# Patient Record
Sex: MALE | URBAN - METROPOLITAN AREA
[De-identification: names, ages, dates, MRNs, and addresses within clinical notes are randomized per-mention and may not be internally consistent; named-entity substitution may affect disease eponyms.]

---

## 2023-11-23 ENCOUNTER — APPOINTMENT (OUTPATIENT)
Dept: RADIOLOGY | Facility: HOSPITAL | Age: 78
DRG: 438 | End: 2023-11-23
Payer: MEDICARE

## 2023-11-23 ENCOUNTER — HOSPITAL ENCOUNTER (INPATIENT)
Facility: HOSPITAL | Age: 78
LOS: 3 days | Discharge: HOME | DRG: 438 | End: 2023-11-26
Attending: INTERNAL MEDICINE | Admitting: INTERNAL MEDICINE
Payer: MEDICARE

## 2023-11-23 DIAGNOSIS — F39 MOOD DISORDER (CMS-HCC): ICD-10-CM

## 2023-11-23 DIAGNOSIS — J96.01 ACUTE HYPOXIC RESPIRATORY FAILURE (MULTI): ICD-10-CM

## 2023-11-23 DIAGNOSIS — N17.9 AKI (ACUTE KIDNEY INJURY) (CMS-HCC): ICD-10-CM

## 2023-11-23 DIAGNOSIS — K59.00 CONSTIPATION, UNSPECIFIED CONSTIPATION TYPE: ICD-10-CM

## 2023-11-23 DIAGNOSIS — R52 ACUTE PAIN: ICD-10-CM

## 2023-11-23 DIAGNOSIS — K86.89 PANCREATIC MASS (HHS-HCC): Primary | ICD-10-CM

## 2023-11-23 LAB
ABO GROUP (TYPE) IN BLOOD: NORMAL
ALBUMIN SERPL BCP-MCNC: 3.2 G/DL (ref 3.4–5)
ALP SERPL-CCNC: 415 U/L (ref 33–136)
ALT SERPL W P-5'-P-CCNC: 134 U/L (ref 10–52)
ANION GAP SERPL CALC-SCNC: 18 MMOL/L (ref 10–20)
ANTIBODY SCREEN: NORMAL
APPEARANCE UR: ABNORMAL
APTT PPP: >200 SECONDS (ref 27–38)
AST SERPL W P-5'-P-CCNC: 113 U/L (ref 9–39)
BASOPHILS # BLD AUTO: 0.01 X10*3/UL (ref 0–0.1)
BASOPHILS NFR BLD AUTO: 0.1 %
BILIRUB DIRECT SERPL-MCNC: 4.8 MG/DL (ref 0–0.3)
BILIRUB SERPL-MCNC: 6.7 MG/DL (ref 0–1.2)
BILIRUB UR STRIP.AUTO-MCNC: NEGATIVE MG/DL
BUN SERPL-MCNC: 58 MG/DL (ref 6–23)
CALCIUM SERPL-MCNC: 8.8 MG/DL (ref 8.6–10.6)
CANCER AG19-9 SERPL-ACNC: 5157.47 U/ML
CHLORIDE SERPL-SCNC: 100 MMOL/L (ref 98–107)
CO2 SERPL-SCNC: 26 MMOL/L (ref 21–32)
COLOR UR: ABNORMAL
CREAT SERPL-MCNC: 4.99 MG/DL (ref 0.5–1.3)
D DIMER PPP FEU-MCNC: 2050 NG/ML FEU
EOSINOPHIL # BLD AUTO: 0.06 X10*3/UL (ref 0–0.4)
EOSINOPHIL NFR BLD AUTO: 0.7 %
ERYTHROCYTE [DISTWIDTH] IN BLOOD BY AUTOMATED COUNT: 18 % (ref 11.5–14.5)
FIBRINOGEN PPP-MCNC: 654 MG/DL (ref 200–400)
GFR SERPL CREATININE-BSD FRML MDRD: 11 ML/MIN/1.73M*2
GLUCOSE BLD MANUAL STRIP-MCNC: 145 MG/DL (ref 74–99)
GLUCOSE BLD MANUAL STRIP-MCNC: 176 MG/DL (ref 74–99)
GLUCOSE SERPL-MCNC: 157 MG/DL (ref 74–99)
GLUCOSE UR STRIP.AUTO-MCNC: NEGATIVE MG/DL
HCT VFR BLD AUTO: 31.5 % (ref 41–52)
HGB BLD-MCNC: 10.2 G/DL (ref 13.5–17.5)
IMM GRANULOCYTES # BLD AUTO: 0.03 X10*3/UL (ref 0–0.5)
IMM GRANULOCYTES NFR BLD AUTO: 0.4 % (ref 0–0.9)
INR PPP: 4.1 (ref 0.9–1.1)
KETONES UR STRIP.AUTO-MCNC: NEGATIVE MG/DL
LEUKOCYTE ESTERASE UR QL STRIP.AUTO: NEGATIVE
LYMPHOCYTES # BLD AUTO: 0.6 X10*3/UL (ref 0.8–3)
LYMPHOCYTES NFR BLD AUTO: 7.4 %
MAGNESIUM SERPL-MCNC: 2.32 MG/DL (ref 1.6–2.4)
MCH RBC QN AUTO: 27.6 PG (ref 26–34)
MCHC RBC AUTO-ENTMCNC: 32.4 G/DL (ref 32–36)
MCV RBC AUTO: 85 FL (ref 80–100)
MONOCYTES # BLD AUTO: 0.6 X10*3/UL (ref 0.05–0.8)
MONOCYTES NFR BLD AUTO: 7.4 %
NEUTROPHILS # BLD AUTO: 6.76 X10*3/UL (ref 1.6–5.5)
NEUTROPHILS NFR BLD AUTO: 84 %
NITRITE UR QL STRIP.AUTO: NEGATIVE
NRBC BLD-RTO: 0 /100 WBCS (ref 0–0)
PH UR STRIP.AUTO: 6 [PH]
PLATELET # BLD AUTO: 252 X10*3/UL (ref 150–450)
POTASSIUM SERPL-SCNC: 4.7 MMOL/L (ref 3.5–5.3)
PROT SERPL-MCNC: 7.5 G/DL (ref 6.4–8.2)
PROT UR STRIP.AUTO-MCNC: ABNORMAL MG/DL
PROTHROMBIN TIME: 47.5 SECONDS (ref 9.8–12.8)
RBC # BLD AUTO: 3.69 X10*6/UL (ref 4.5–5.9)
RBC # UR STRIP.AUTO: ABNORMAL /UL
RBC #/AREA URNS AUTO: >20 /HPF
RH FACTOR (ANTIGEN D): NORMAL
SODIUM SERPL-SCNC: 139 MMOL/L (ref 136–145)
SP GR UR STRIP.AUTO: 1.01
UROBILINOGEN UR STRIP.AUTO-MCNC: <2 MG/DL
WBC # BLD AUTO: 8.1 X10*3/UL (ref 4.4–11.3)
WBC #/AREA URNS AUTO: ABNORMAL /HPF

## 2023-11-23 PROCEDURE — 2500000004 HC RX 250 GENERAL PHARMACY W/ HCPCS (ALT 636 FOR OP/ED): Performed by: STUDENT IN AN ORGANIZED HEALTH CARE EDUCATION/TRAINING PROGRAM

## 2023-11-23 PROCEDURE — 71045 X-RAY EXAM CHEST 1 VIEW: CPT

## 2023-11-23 PROCEDURE — 85610 PROTHROMBIN TIME: CPT | Performed by: STUDENT IN AN ORGANIZED HEALTH CARE EDUCATION/TRAINING PROGRAM

## 2023-11-23 PROCEDURE — 81001 URINALYSIS AUTO W/SCOPE: CPT | Performed by: STUDENT IN AN ORGANIZED HEALTH CARE EDUCATION/TRAINING PROGRAM

## 2023-11-23 PROCEDURE — 2500000004 HC RX 250 GENERAL PHARMACY W/ HCPCS (ALT 636 FOR OP/ED)

## 2023-11-23 PROCEDURE — 93010 ELECTROCARDIOGRAM REPORT: CPT | Performed by: INTERNAL MEDICINE

## 2023-11-23 PROCEDURE — 85025 COMPLETE CBC W/AUTO DIFF WBC: CPT | Performed by: STUDENT IN AN ORGANIZED HEALTH CARE EDUCATION/TRAINING PROGRAM

## 2023-11-23 PROCEDURE — 82248 BILIRUBIN DIRECT: CPT

## 2023-11-23 PROCEDURE — 36415 COLL VENOUS BLD VENIPUNCTURE: CPT | Performed by: STUDENT IN AN ORGANIZED HEALTH CARE EDUCATION/TRAINING PROGRAM

## 2023-11-23 PROCEDURE — 1210000001 HC SEMI-PRIVATE ROOM DAILY

## 2023-11-23 PROCEDURE — 36415 COLL VENOUS BLD VENIPUNCTURE: CPT | Performed by: INTERNAL MEDICINE

## 2023-11-23 PROCEDURE — 86900 BLOOD TYPING SEROLOGIC ABO: CPT | Performed by: STUDENT IN AN ORGANIZED HEALTH CARE EDUCATION/TRAINING PROGRAM

## 2023-11-23 PROCEDURE — 96372 THER/PROPH/DIAG INJ SC/IM: CPT

## 2023-11-23 PROCEDURE — 83735 ASSAY OF MAGNESIUM: CPT | Performed by: STUDENT IN AN ORGANIZED HEALTH CARE EDUCATION/TRAINING PROGRAM

## 2023-11-23 PROCEDURE — 2500000002 HC RX 250 W HCPCS SELF ADMINISTERED DRUGS (ALT 637 FOR MEDICARE OP, ALT 636 FOR OP/ED)

## 2023-11-23 PROCEDURE — 85384 FIBRINOGEN ACTIVITY: CPT | Performed by: STUDENT IN AN ORGANIZED HEALTH CARE EDUCATION/TRAINING PROGRAM

## 2023-11-23 PROCEDURE — 71045 X-RAY EXAM CHEST 1 VIEW: CPT | Performed by: STUDENT IN AN ORGANIZED HEALTH CARE EDUCATION/TRAINING PROGRAM

## 2023-11-23 PROCEDURE — 86301 IMMUNOASSAY TUMOR CA 19-9: CPT | Performed by: STUDENT IN AN ORGANIZED HEALTH CARE EDUCATION/TRAINING PROGRAM

## 2023-11-23 PROCEDURE — 80053 COMPREHEN METABOLIC PANEL: CPT | Performed by: STUDENT IN AN ORGANIZED HEALTH CARE EDUCATION/TRAINING PROGRAM

## 2023-11-23 PROCEDURE — 82947 ASSAY GLUCOSE BLOOD QUANT: CPT

## 2023-11-23 PROCEDURE — 85379 FIBRIN DEGRADATION QUANT: CPT | Performed by: STUDENT IN AN ORGANIZED HEALTH CARE EDUCATION/TRAINING PROGRAM

## 2023-11-23 RX ORDER — INSULIN GLARGINE 100 [IU]/ML
10 INJECTION, SOLUTION SUBCUTANEOUS NIGHTLY
Status: DISCONTINUED | OUTPATIENT
Start: 2023-11-23 | End: 2023-11-23

## 2023-11-23 RX ORDER — TALC
3 POWDER (GRAM) TOPICAL NIGHTLY
Status: DISCONTINUED | OUTPATIENT
Start: 2023-11-23 | End: 2023-11-26 | Stop reason: HOSPADM

## 2023-11-23 RX ORDER — PANTOPRAZOLE SODIUM 40 MG/1
40 TABLET, DELAYED RELEASE ORAL
Status: DISCONTINUED | OUTPATIENT
Start: 2023-11-24 | End: 2023-11-24

## 2023-11-23 RX ORDER — INSULIN LISPRO 100 [IU]/ML
0-5 INJECTION, SOLUTION INTRAVENOUS; SUBCUTANEOUS EVERY 4 HOURS
Status: DISCONTINUED | OUTPATIENT
Start: 2023-11-23 | End: 2023-11-25

## 2023-11-23 RX ORDER — POLYETHYLENE GLYCOL 3350 17 G/17G
17 POWDER, FOR SOLUTION ORAL DAILY
Status: DISCONTINUED | OUTPATIENT
Start: 2023-11-23 | End: 2023-11-26 | Stop reason: HOSPADM

## 2023-11-23 RX ORDER — DEXTROSE MONOHYDRATE 100 MG/ML
0.3 INJECTION, SOLUTION INTRAVENOUS ONCE AS NEEDED
Status: DISCONTINUED | OUTPATIENT
Start: 2023-11-23 | End: 2023-11-25

## 2023-11-23 RX ORDER — DEXTROSE 50 % IN WATER (D50W) INTRAVENOUS SYRINGE
25
Status: DISCONTINUED | OUTPATIENT
Start: 2023-11-23 | End: 2023-11-25

## 2023-11-23 RX ORDER — INSULIN LISPRO 100 [IU]/ML
0-5 INJECTION, SOLUTION INTRAVENOUS; SUBCUTANEOUS
Status: DISCONTINUED | OUTPATIENT
Start: 2023-11-23 | End: 2023-11-23

## 2023-11-23 RX ORDER — HEPARIN SODIUM 5000 [USP'U]/ML
5000 INJECTION, SOLUTION INTRAVENOUS; SUBCUTANEOUS EVERY 8 HOURS
Status: DISCONTINUED | OUTPATIENT
Start: 2023-11-23 | End: 2023-11-25

## 2023-11-23 RX ORDER — TAMSULOSIN HYDROCHLORIDE 0.4 MG/1
0.4 CAPSULE ORAL DAILY
Status: DISCONTINUED | OUTPATIENT
Start: 2023-11-23 | End: 2023-11-25

## 2023-11-23 RX ORDER — IPRATROPIUM BROMIDE AND ALBUTEROL SULFATE 2.5; .5 MG/3ML; MG/3ML
3 SOLUTION RESPIRATORY (INHALATION)
Status: DISCONTINUED | OUTPATIENT
Start: 2023-11-24 | End: 2023-11-24

## 2023-11-23 RX ORDER — SERTRALINE HYDROCHLORIDE 100 MG/1
100 TABLET, FILM COATED ORAL NIGHTLY
Status: DISCONTINUED | OUTPATIENT
Start: 2023-11-23 | End: 2023-11-26 | Stop reason: HOSPADM

## 2023-11-23 RX ORDER — CEFTRIAXONE 2 G/50ML
2 INJECTION, SOLUTION INTRAVENOUS EVERY 24 HOURS
Status: COMPLETED | OUTPATIENT
Start: 2023-11-23 | End: 2023-11-25

## 2023-11-23 RX ADMIN — INSULIN LISPRO 1 UNITS: 100 INJECTION, SOLUTION INTRAVENOUS; SUBCUTANEOUS at 16:02

## 2023-11-23 RX ADMIN — HEPARIN SODIUM 5000 UNITS: 5000 INJECTION INTRAVENOUS; SUBCUTANEOUS at 16:02

## 2023-11-23 RX ADMIN — CEFTRIAXONE SODIUM 2 G: 2 INJECTION, SOLUTION INTRAVENOUS at 15:20

## 2023-11-23 SDOH — SOCIAL STABILITY: SOCIAL INSECURITY: DOES ANYONE TRY TO KEEP YOU FROM HAVING/CONTACTING OTHER FRIENDS OR DOING THINGS OUTSIDE YOUR HOME?: NO

## 2023-11-23 SDOH — SOCIAL STABILITY: SOCIAL INSECURITY: HAS ANYONE EVER THREATENED TO HURT YOUR FAMILY OR YOUR PETS?: NO

## 2023-11-23 SDOH — SOCIAL STABILITY: SOCIAL INSECURITY: HAVE YOU HAD THOUGHTS OF HARMING ANYONE ELSE?: NO

## 2023-11-23 SDOH — SOCIAL STABILITY: SOCIAL INSECURITY: ARE YOU OR HAVE YOU BEEN THREATENED OR ABUSED PHYSICALLY, EMOTIONALLY, OR SEXUALLY BY ANYONE?: NO

## 2023-11-23 SDOH — SOCIAL STABILITY: SOCIAL INSECURITY: ABUSE: ADULT

## 2023-11-23 SDOH — SOCIAL STABILITY: SOCIAL INSECURITY: DO YOU FEEL UNSAFE GOING BACK TO THE PLACE WHERE YOU ARE LIVING?: NO

## 2023-11-23 SDOH — SOCIAL STABILITY: SOCIAL INSECURITY: DO YOU FEEL ANYONE HAS EXPLOITED OR TAKEN ADVANTAGE OF YOU FINANCIALLY OR OF YOUR PERSONAL PROPERTY?: YES

## 2023-11-23 SDOH — SOCIAL STABILITY: SOCIAL INSECURITY: ARE THERE ANY APPARENT SIGNS OF INJURIES/BEHAVIORS THAT COULD BE RELATED TO ABUSE/NEGLECT?: NO

## 2023-11-23 ASSESSMENT — LIFESTYLE VARIABLES
AUDIT-C TOTAL SCORE: 0
AUDIT-C TOTAL SCORE: 0
HOW MANY STANDARD DRINKS CONTAINING ALCOHOL DO YOU HAVE ON A TYPICAL DAY: PATIENT DOES NOT DRINK
SKIP TO QUESTIONS 9-10: 1
HOW OFTEN DO YOU HAVE A DRINK CONTAINING ALCOHOL: NEVER
HOW OFTEN DO YOU HAVE 6 OR MORE DRINKS ON ONE OCCASION: NEVER

## 2023-11-23 ASSESSMENT — COGNITIVE AND FUNCTIONAL STATUS - GENERAL
MOVING TO AND FROM BED TO CHAIR: A LOT
TURNING FROM BACK TO SIDE WHILE IN FLAT BAD: A LOT
HELP NEEDED FOR BATHING: A LOT
MOVING FROM LYING ON BACK TO SITTING ON SIDE OF FLAT BED WITH BEDRAILS: A LITTLE
PERSONAL GROOMING: A LOT
EATING MEALS: A LOT
STANDING UP FROM CHAIR USING ARMS: A LOT
TOILETING: A LOT
PATIENT BASELINE BEDBOUND: NO
MOBILITY SCORE: 11
CLIMB 3 TO 5 STEPS WITH RAILING: TOTAL
DRESSING REGULAR UPPER BODY CLOTHING: A LOT
DRESSING REGULAR LOWER BODY CLOTHING: A LOT
DAILY ACTIVITIY SCORE: 12
WALKING IN HOSPITAL ROOM: TOTAL

## 2023-11-23 ASSESSMENT — ACTIVITIES OF DAILY LIVING (ADL)
DRESSING YOURSELF: NEEDS ASSISTANCE
FEEDING YOURSELF: NEEDS ASSISTANCE
ADEQUATE_TO_COMPLETE_ADL: YES
PATIENT'S MEMORY ADEQUATE TO SAFELY COMPLETE DAILY ACTIVITIES?: NO
ASSISTIVE_DEVICE: PROSTHESIS
LACK_OF_TRANSPORTATION: NO
JUDGMENT_ADEQUATE_SAFELY_COMPLETE_DAILY_ACTIVITIES: NO
GROOMING: NEEDS ASSISTANCE
WALKS IN HOME: DEPENDENT
BATHING: DEPENDENT
TOILETING: DEPENDENT
HEARING - RIGHT EAR: FUNCTIONAL
HEARING - LEFT EAR: FUNCTIONAL

## 2023-11-23 ASSESSMENT — PAIN SCALES - GENERAL: PAINLEVEL_OUTOF10: 0 - NO PAIN

## 2023-11-23 ASSESSMENT — PATIENT HEALTH QUESTIONNAIRE - PHQ9
1. LITTLE INTEREST OR PLEASURE IN DOING THINGS: NOT AT ALL
2. FEELING DOWN, DEPRESSED OR HOPELESS: NOT AT ALL
SUM OF ALL RESPONSES TO PHQ9 QUESTIONS 1 & 2: 0

## 2023-11-23 ASSESSMENT — COLUMBIA-SUICIDE SEVERITY RATING SCALE - C-SSRS
1. IN THE PAST MONTH, HAVE YOU WISHED YOU WERE DEAD OR WISHED YOU COULD GO TO SLEEP AND NOT WAKE UP?: NO
2. HAVE YOU ACTUALLY HAD ANY THOUGHTS OF KILLING YOURSELF?: NO
6. HAVE YOU EVER DONE ANYTHING, STARTED TO DO ANYTHING, OR PREPARED TO DO ANYTHING TO END YOUR LIFE?: NO

## 2023-11-23 NOTE — NURSING NOTE
"Patient admitted to [place] [address] from [facility] this morning. He stated he currently lives at home with his son. He was able to answer some of the admission screening questions but stated \"I don't know\" to several of them as well. He was oriented to the room and the call light. His belongings are at the bedside and documented in the admission navigator. He was missing his dentures on arrival. The bedside nurse, Lindy, called [place] and they have them there still so she asked them to hold onto them until we can reach a family member or someone to go pick them.    "

## 2023-11-23 NOTE — CARE PLAN
The patient's goals for the shift include  to be able to drink water     The clinical goals for the shift include Patient will remain HDS through shift    Over the shift, the patient did not make progress toward the following goals. Barriers to progression include difficulty swallowing. Recommendations to address these barriers include patient to be seen by SLT.

## 2023-11-23 NOTE — H&P
"History Of Present Illness  Asad Vines is a 78 y.o. 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 Now transferred to [facility] on 11/23 for further GI evaluation of pancreatic mass.    On examination this afternoon at bedside, patient is conversive.  Tells provider \" give me a sip of water and then I will talk.\"  Patient has difficulty swallowing water and spilled it all over himself. Of note patient is very shaky and unable to hold objects still. patient is not oriented to time or place.  He tells me that he was planning and then he was taken to [facility] and he is unsure of where he is now.  He cannot provide any details regarding his past medical history and tells me that he is a .  Patient provided provider with his phone, was able to collect further history from son Anselmo over the phone.  Patient's son states that his father was healthy and able to perform some ADLs independently as recently as early October.  However, he had a urinary tract infection and was admitted to the hospital.  While in the hospital he had a complicated course and his cognitive function has since declined.  He states that his father retired at the age of 62 from his job as a  and now lives with the son who is the primary caregiver.  While at [place], the patient was in the ICU and he was managed for pneumonia, urinary tract infection, bloodstream infection, kidney failure, and was being worked up for a pancreatic mass which was suspected to be malignant in nature.  [facility] could not provide any further workup with regards to the pancreatic mass " which is what prompted transfer to [facility]    Outside hospital course:  Per paper records, patient was found to be septic with MRSA bacteremia and MRSA UTI of unknown source.  Infectious disease was consulted [facility].  HELEN on 11/10/2023 showed no significant evidence of vegetations on the valves, no endocarditis, mild MR, no evidence of thrombus in the atrial cavity or appendage, no defect or PFO identified.  TTE on 11/8/2023 showed concentric left VH, likely normal systolic function, regional wall motion abnormalities cannot be excluded.  Patient received tagged WBC study which did not find a source of infection.  On 11/14 patient was noted to be jaundiced with hyperbilirubinemia and elevated LFTs.  GI was consulted with plan for ERCP however this was later deferred due to coagulopathy with INR elevated to 4.0 even despite FFP and vitamin K.  During his hospital stay, patient developed QI and eventual renal failure thought due to AIN due to vancomycin vs ATN due to shock. Nephrology eventually start the patient on dialysis with first session 11/21 and second session 11/22.  On 11/18, rapid response was called for hypoxia thought due to aspiration.  Patient eventually required transfer to the MICU on high flow nasal cannula and was later intubated after altered mental status.  Prior to intubation, patient was noted to be hypotensive with SBP in 90s, for which norepinephrine was started on.  Patient was extubated eventually to 5L NC. Transferred out of ICU on 11/23.      More recently, pt was on meropenem and daptomycin as well as prednisone per pulm. Per OSH notes, the plan was to complete a full 6 week course of IV antibiotics for MRSA bacteremia with end date of 12/21/23. Treatment course for klebsiella HAP for 7 day course to end 11/25/23.      On interview at [facility], pt is somewhat confused. He is able to state that he is in the hospital due to an infection and his kidneys are not  working. He reports a dry cough. No chest pain, fever, chills, sob, abd pain, bleeding, hematuria, dysuria.      Additional collateral from his son via the phone:   Son is his caregiver. Pt at baseline is able to transfer from bed to wheelchair independently and to bathroom. Pt had been working with physical therapy and had overall been in his usual state of health. At the end of October, he developed symptoms of confusion and incontinence and presented to the hospital where he was diagnosed with UTI and COVID infection. During this hospitalization, CT incidentally found a pancreas mass for which they were planning to follow up outpatient. Son denies his father had symptoms of weight loss, jaundice, or abdominal pain at the time. He was discharged on oral antibiotics for the UTI however symptoms worsened leading him to re-present in November at [facility].      PCP - Dr. Camilo Hobson [facility] out of [place]      Baseline unable to raise hands above shoulders due to shoulder issues.      Inpatient med list from his outside hospital   Scheduled DuoNebs 4 times daily  Aspirin 81  Budesonide inhaled  Daptomycin 450 mg every 48 hours  Gabapentin 400 twice daily  Guaifenesin 600 extended release every 12 hours  Ceftriaxone 2g  Pantoprazole 40 daily  MiraLAX daily  Prednisone 40 mg daily  Sertraline 100 mg nightly  Tamsulosin 0.4  Melatonin 3 mg  Zofran  Phenazopyridine 100 mg 3 times daily     Most recent labs 11/21  WBC 8.3, hemoglobin 9.4, platelet 254  Sodium 137 potassium 5.2 BUN 88 creatinine 6.11 glucose 249  Total protein 6.4, albumin 2.3, AST 94, , T. bili 3.2, alk phos 436  PT 47.9  INR 4.1     Micro (called micro lab at [facility] to confirm)   Blood culture 11/6 - MRSA (sensitive: ceftaroline, ciprofloxacin, clindamycin, daptomycin, levofloxacin, linezolid, rifampin, tetracycline, vancomycin; resistant - azithromycin, cefepime, cefotaxime, ceftriaxone, erythromycin, imipenem, meropenem, oxacillin,  penicillin)   Blood culture 11/7 - MRSA  Blood culture 11/9 - NGTD x 5 days   Blood culture 11/18 - NGTD x 5 days     Urine culture 10/25/23 - No growth  Urine culture 11/6/23  positive for >100,000 CFU MRSA and enterococcus faecalis (sensitive to ampicillin, gentimicin, vancomycin; resistant to levofloxacin, ciprofloxacin)      Tracheal aspirate culture 11/19/23 - klebsiella oxytocin (intermediate to cefazolin; sensitive to amikacin, ampicillin, cefotaxime, cefuroxime, ciprofloxacin, gent, imipenem, levofloxacin, meropenem, minocycline, piptazo, tetracycline), candida albicans, candida galbrata      Abx course  Vancomycin (11/7-11/16)   Daptomycin (11/19- )   Meropenem (11/19- ) with possible plans to transition to CTX for klebsiella based on sensitivities     Imaging from outside hospital  Chest x-ray November 6, 2023   Impression: No acute radiographic process     CT head without contrast November 6, 2023  Impression: 1.  No acute intracranial pathology  2.  Prominent CSF spaces usually indicate volume loss/atrophy as manifestation of mild chronic white matter ischemia     CT head without contrast November 12, 2023  Impression: No acute intracranial hemorrhage or acute large territory infarct     Ultrasound renal November 17, 2023  Impression: There is left renal mild to moderate pelvic caliectasis that is new from the earlier study.  Etiology is unknown.  The right kidney is normal.     MRI pancreas November 6, 2023  Impression: Hypoenhancing infiltrative lesion centered at the pancreatic head is again highly concerning for neoplasm.  There is resultant intrahepatic and extrahepatic biliary ductal dilation, with abrupt caliber change of the distal bile duct at the level of the infiltrative lesion.  Further assessment with endoscopic ultrasound potential biopsy is advised, as previously recommended on the 10/28/2023 exam.  Abrupt caliber change of the superior mesenteric vein is unchanged and is suspicious for at  "least partial encasement.  Multiple small cystic pancreatic lesions could reflect postinflammatory change, or benign/low-grade cystic neoplasms.  Recommend attention on follow-up.     Nuclear medicine white blood cell study November 15, 2023  Impression: No abnormal white blood cell accumulation to suggest active infection     Ultrasound abdomen complete November 15, 2023  Impression: Biliary and pancreatic ductal dilation and hydropic gallbladder.  The cause of obstruction is not identified.  Suspicious lesion in the head of the pancreas is not able to be identified by ultrasound.  2 cm ill-defined hypoechoic lesion in the liver remains indeterminate.  Mild renal atrophy     Chest x-ray 11/20  Impression: Unchanged left basilar pneumonia.  Minimal atelectasis at right lung base     CT abdomen/pelvis without contrast November 18, 2023  Impression: Pancreatic head mass is better seen on MRI and previous contrast-enhanced CT but appears unchanged in size.  There is biliary ductal dilation and hydropic gallbladder, presumably due to biliary obstruction associated with the mass     CT thorax without contrast November 18, 2023  Impression: New left lower lobe airspace disease consistent with pneumonia and small left pleural effusion, compared with CT of the chest on 10/27/2023.  Infiltrate and pleural fluid are not significantly changed compared with chest x-ray earlier in the day.  Mild right basilar atelectasis.  Advanced emphysema.  Atherosclerotic coronary artery calcification     Past Medical History  PMHx:  COPD (wears oxygen as needed if SpO2 < 90% at home, will wear 2L NC)   Heart attack x 2 with stents (years ago)   HTN   Pre diabetic  HLD  Neuropathy  \"Clots in the legs\" - was taking xarelto   Bladder cancer - 1979   Permanent dentures, bottom ones screwed in     Surgical History  AKA (2020) for an infection      Social History  Assistance with bathing  Independent in toileting   Former smoker, none since " "amputation  Alcohol: none  Drugs: none       Family History  Family history of diabetes, no known cancer history     Allergies  Penicillins    Review of Systems  Patient unable to participate.  Endorses thirst     Physical Exam  Constitutional:       General: He is not in acute distress.     Comments: Elderly male, appears chronically ill, alert, conversant   HENT:      Head: Normocephalic and atraumatic.      Mouth/Throat:      Mouth: Mucous membranes are moist.      Comments: Dried blood surrounding lips, appears to have light bleeding in back of mouth  Eyes:      Conjunctiva/sclera: Conjunctivae normal.      Pupils: Pupils are equal, round, and reactive to light.   Cardiovascular:      Rate and Rhythm: Normal rate and regular rhythm.      Pulses: Normal pulses.      Heart sounds: Normal heart sounds.   Pulmonary:      Effort: Pulmonary effort is normal. No respiratory distress.   Abdominal:      General: Abdomen is flat.      Palpations: Abdomen is soft.   Skin:     General: Skin is warm and dry.   Neurological:      Mental Status: He is alert. He is disoriented.      Motor: No weakness.       Last Recorded Vitals  Blood pressure 149/71, pulse 85, temperature 36.3 °C (97.3 °F), resp. rate 16, height 1.676 m (5' 6\"), weight 56.7 kg (125 lb), SpO2 92 %.    Relevant Results    Scheduled medications  cefTRIAXone, 2 g, intravenous, q24h  heparin (porcine), 5,000 Units, subcutaneous, q8h  insulin lispro, 0-5 Units, subcutaneous, q4h  melatonin, 3 mg, oral, Nightly  [START ON 11/24/2023] pantoprazole, 40 mg, oral, Daily before breakfast  polyethylene glycol, 17 g, oral, Daily  sertraline, 100 mg, oral, Nightly  tamsulosin, 0.4 mg, oral, Daily      Continuous medications     PRN medications  PRN medications: dextrose 10 % in water (D10W), dextrose, glucagon       Assessment/Plan   Principal Problem:    Pancreatic mass      Asad Vines is an 78-year-old with past medical history COPD, 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 Now transferred to [facility] on 11/23 for further GI evaluation of pancreatic mass. Planning to consult GI for tissue diagnosis of suspected pancreatic cancer and supportive oncology.  Also have ID on board for MRSA bacteremia.  Will consult renal for oliguric QI.     #Pancreatic mass  #Hyperbilirubinemia 2/2 extrinsic compression   -concerning for malignancy  -will follow up CA 19-9  -GI consult tomorrow for evaluation for possible biopsy via EUS  -Discussed GOC with family. They would like to pursue a diagnosis and understand it is likely cancer. In interim, would like father to be full code  -Family agreeable to supportive onc consult. Will consult tomorrow AM      #Coagulopathy   -INR reported 4 at OSH. Previously received vitamin K and FFP at OSH   -repeat coags here. Obtain fibringen and d dimer to eval for DIC    -Night team to follow-up on INR.  Will transfuse factor as needed     #MRSA bacteremia  #MRSA and E faecalis UTI   -Family reports traumatic oleary placement during his admission in October which could be a source for his MRSA  -TTE and HELEN at [place] without evidence of vegetation. Tagged WBC scan without occult source of infection   -Blood cultures clear since 11/9  -continue daptomycin for treatment of MRSA. Had been receiving after his HD sessions. After discussion with pharmacy will hold off on ordering until his upcoming HD sessions are established   -Will consult ID for ongoing abx recs   Prior abx course:   Vancomycin (11/7-11/16) stopped due to kidney injury   Daptomycin (11/19 - );  per OSH MAR last dose was given 11/21  Meropenem (11/19- 11/22)   Ceftriaxone (11/22- )     #HAP  #Acute hypoxic resp failure  #c/f aspiration   -Tracheal aspirate with Klebsiella   -continue with planned 7 day course of abx to end 11/25. Previously on meropenem, will narrow to CTX based on sensitivities   -continue O2 to maintain sats > 88%   -swallow eval given high aspiration risk   -Was on prednisone 40mg at OSH for ?COPD exacerbation? Will hold off on steroids and re-assess     #oliguric QI requiring dialysis via temporary R HD line   -Family reports no hx of CKD. Reportedly developed QI during hospital stay thought due to AIN from vanc vs ATN   -Pt underwent first dialysis 11/21. A second session performed 11/22.   -Repeat labs here   -Maintain oleary, strict I/Os, avoid nephro toxins  -Renal consult     #COPD  -continue home inhalers      #Hx VTE  -previously on xarelto at home   -Given need for biopsy, will hold on therapeutic anticoagulation      F: PRN  E: PRN  N: NPO pending speech eval  Access: R temp HD line, oleary, PIV   Oxygen: room air  Drips: none  DVT ppx: SubQ heparin  GI ppx: pantoprazole  Antibiotics: Ceftriaxone (until 11/25)  Code Status: Full, confirmed with gwen DUFFY: Anselmo Vines (Son): [phone], Christy (daughter): [phone]  Dispo: PT/OT eval        Shaw Gomez MD

## 2023-11-23 NOTE — SIGNIFICANT EVENT
Senior Staffing Note    Please see intern note for full details.     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 Now transferred to [facility] on 11/23 for further GI evaluation of pancreatic mass.    Per paper records, patient was found to be septic with MRSA bacteremia and MRSA UTI of unknown source.  Infectious disease was consulted [facility].  HELEN on 11/10/2023 showed no significant evidence of vegetations on the valves, no endocarditis, mild MR, no evidence of thrombus in the atrial cavity or appendage, no defect or PFO identified.  TTE on 11/8/2023 showed concentric left VH, likely normal systolic function, regional wall motion abnormalities cannot be excluded.  Patient received tagged WBC study which did not find a source of infection.  On 11/14 patient was noted to be jaundiced with hyperbilirubinemia and elevated LFTs.  GI was consulted with plan for ERCP however this was later deferred due to coagulopathy with INR elevated to 4.0 even despite FFP and vitamin K.  During his hospital stay, patient developed QI and eventual renal failure thought due to AIN due to vancomycin vs ATN due to shock. Nephrology eventually start the patient on dialysis with first session 11/21 and second session 11/22.  On 11/18, rapid response was called for hypoxia thought due to aspiration.  Patient eventually required transfer to the MICU on high flow nasal cannula and was later intubated after altered mental status.  Prior to intubation, patient was noted to be hypotensive with SBP in 90s, for which norepinephrine was started on.   Patient was extubated eventually to 5L NC. Transferred out of ICU on 11/23.     More recently, pt was on meropenem and daptomycin as well as prednisone per pulm. Per OSH notes, the plan was to complete a full 6 week course of IV antibiotics for MRSA bacteremia with end date of 12/21/23. Treatment course for klebsiella HAP for 7 day course to end 11/25/23.     On interview at [facility], pt is somewhat confused. He is able to state that he is in the hospital due to an infection and his kidneys are not working. He reports a dry cough. No chest pain, fever, chills, sob, abd pain, bleeding, hematuria, dysuria.     Additional collateral from his son via the phone:   Son is his caregiver. Pt at baseline is able to transfer from bed to wheelchair independently and to bathroom. Pt had been working with physical therapy and had overall been in his usual state of health. At the end of October, he developed symptoms of confusion and incontinence and presented to the hospital where he was diagnosed with UTI and COVID infection. During this hospitalization, CT incidentally found a pancreas mass for which they were planning to follow up outpatient. Son denies his father had symptoms of weight loss, jaundice, or abdominal pain at the time. He was discharged on oral antibiotics for the UTI however symptoms worsened leading him to re-present in November at [facility].     PCP - Dr. Camilo Hobson [facility] out of [facility] unable to raise hands above shoulders due to shoulder issues.     Inpatient med list from his outside hospital   Scheduled DuoNebs 4 times daily  Aspirin 81  Budesonide inhaled  Daptomycin 450 mg every 48 hours  Gabapentin 400 twice daily  Guaifenesin 600 extended release every 12 hours  Ceftriaxone 2g  Pantoprazole 40 daily  MiraLAX daily  Prednisone 40 mg daily  Sertraline 100 mg nightly  Tamsulosin 0.4  Melatonin 3 mg  Zofran  Phenazopyridine 100 mg 3 times daily    Most recent labs 11/21  WBC  8.3, hemoglobin 9.4, platelet 254  Sodium 137 potassium 5.2 BUN 88 creatinine 6.11 glucose 249  Total protein 6.4, albumin 2.3, AST 94, , T. bili 3.2, alk phos 436  PT 47.9  INR 4.1    Micro (called micro lab at [place] to confirm)   Blood culture 11/6 - MRSA (sensitive: ceftaroline, ciprofloxacin, clindamycin, daptomycin, levofloxacin, linezolid, rifampin, tetracycline, vancomycin; resistant - azithromycin, cefepime, cefotaxime, ceftriaxone, erythromycin, imipenem, meropenem, oxacillin, penicillin)   Blood culture 11/7 - MRSA  Blood culture 11/9 - NGTD x 5 days   Blood culture 11/18 - NGTD x 5 days    Urine culture 10/25/23 - No growth  Urine culture 11/6/23  positive for >100,000 CFU MRSA and enterococcus faecalis (sensitive to ampicillin, gentimicin, vancomycin; resistant to levofloxacin, ciprofloxacin)      Tracheal aspirate culture 11/19/23 - klebsiella oxytocin (intermediate to cefazolin; sensitive to amikacin, ampicillin, cefotaxime, cefuroxime, ciprofloxacin, gent, imipenem, levofloxacin, meropenem, minocycline, piptazo, tetracycline), candida albicans, candida galbrata     Abx course  Vancomycin (11/7-11/16)   Daptomycin (11/19- )   Meropenem (11/19- ) with possible plans to transition to CTX for klebsiella based on sensitivities    Imaging from outside hospital  Chest x-ray November 6, 2023   Impression: No acute radiographic process    CT head without contrast November 6, 2023  Impression: 1.  No acute intracranial pathology  2.  Prominent CSF spaces usually indicate volume loss/atrophy as manifestation of mild chronic white matter ischemia    CT head without contrast November 12, 2023  Impression: No acute intracranial hemorrhage or acute large territory infarct    Ultrasound renal November 17, 2023  Impression: There is left renal mild to moderate pelvic caliectasis that is new from the earlier study.  Etiology is unknown.  The right kidney is normal.    MRI pancreas November 6, 2023  Impression:  Hypoenhancing infiltrative lesion centered at the pancreatic head is again highly concerning for neoplasm.  There is resultant intrahepatic and extrahepatic biliary ductal dilation, with abrupt caliber change of the distal bile duct at the level of the infiltrative lesion.  Further assessment with endoscopic ultrasound potential biopsy is advised, as previously recommended on the 10/28/2023 exam.  Abrupt caliber change of the superior mesenteric vein is unchanged and is suspicious for at least partial encasement.  Multiple small cystic pancreatic lesions could reflect postinflammatory change, or benign/low-grade cystic neoplasms.  Recommend attention on follow-up.    Nuclear medicine white blood cell study November 15, 2023  Impression: No abnormal white blood cell accumulation to suggest active infection    Ultrasound abdomen complete November 15, 2023  Impression: Biliary and pancreatic ductal dilation and hydropic gallbladder.  The cause of obstruction is not identified.  Suspicious lesion in the head of the pancreas is not able to be identified by ultrasound.  2 cm ill-defined hypoechoic lesion in the liver remains indeterminate.  Mild renal atrophy     Chest x-ray 11/20  Impression: Unchanged left basilar pneumonia.  Minimal atelectasis at right lung base    CT abdomen/pelvis without contrast November 18, 2023  Impression: Pancreatic head mass is better seen on MRI and previous contrast-enhanced CT but appears unchanged in size.  There is biliary ductal dilation and hydropic gallbladder, presumably due to biliary obstruction associated with the mass    CT thorax without contrast November 18, 2023  Impression: New left lower lobe airspace disease consistent with pneumonia and small left pleural effusion, compared with CT of the chest on 10/27/2023.  Infiltrate and pleural fluid are not significantly changed compared with chest x-ray earlier in the day.  Mild right basilar atelectasis.  Advanced emphysema.   "Atherosclerotic coronary artery calcification      PMHx:  COPD (wears oxygen as needed if SpO2 < 90% at home, will wear 2L NC)   Heart attack x 2 with stents (years ago)   HTN   Pre diabetic  HLD  Neuropathy  \"Clots in the legs\" - was taking xarelto   Bladder cancer - 1979   Permanent dentures, bottom ones screwed in     PSH:   AKA (2020) for an infection     Allergies: penicillin (rash)    Social:  Assistance with bathing  Independent in toileting   Former smoker, none since amputation  Alcohol: none  Drugs: none     Family history: \"diabetes runs through the family\". No known cancer in the family     Objective   Physical Exam:  General: elderly male, appears chronically ill, alert, conversant, in no apparent distress. Somewhat confused with some questions   HEENT: normocephalic, atraumatic, EOMI, no scleral icterus  CV: RRR, no murmurs  Pulm: CTAB, no wheezes or crackles, no increased work of breathing, wearing NC   Abd: soft, non tender, non distended  : oleary present   Ext: warm, no lower extremity edema. R AKA   Skin: no rashes, right temporary dialysis catheter present without evidence of redness or purulence   Neuro: asterixis present   Psych: normal affect     Last Recorded Vitals  Blood pressure 130/63, pulse 89, temperature 37.1 °C (98.8 °F), temperature source Temporal, resp. rate 16, height 1.676 m (5' 6\").    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 " Now transferred to [facility] on 11/23 for further GI evaluation of pancreatic mass.    #Pancreatic mass  #Hyperbilirubinemia 2/2 extrinsic compression   -concerning for malignancy  -obtain CA 19-9  -GI consult tomorrow for evaluation for biopsy   -Discussed GOC with family. They would like to pursue a diagnosis and understand it is likely cancer   -Family agreeable to supportive onc consult. Will consult tomorrow AM     #Coagulopathy   -INR reported 4 at OSH. Previously received vitamin K and FFP at OSH   -repeat coags here. Obtain fibringen and d dimer to eval for DIC      #MRSA bacteremia  #MRSA and E faecalis UTI   -Family reports traumatic oleary placement during his admission in October which could be a source for his MRSA  -TTE and HELEN at [place] without evidence of vegetation. Tagged WBC scan without occult source of infection   -Blood cultures clear since 11/9  -continue daptomycin for treatment of MRSA. Had been receiving after his HD sessions. After discssuion with pharmacy will hold off on ordering until his upcoming HD sessions are established   -Will consult ID for ongoing abx recs     Prior abx course:   Vancomycin (11/7-11/16) stopped due to kidney injury   Daptomycin (11/19 - ); per OSH MAR last dose was given 11/21  Meropenem (11/19- 11/22)   Ceftriaxone (11/22- )    #HAP  #Acute hypoxic resp failure  #c/f aspiration   -Tracheal aspirate with Klebsiella   -continue with planned 7 day course of abx to end 11/25. Previously on meropenem, will narrow to CTX based on sensitivities   -continue O2 to maintain sats > 88%   -swallow eval given high aspiration risk   -Was on prednisone 40mg at OSH for ?COPD exacerbation? Will hold off on steroids and re-assess    #oliguric QI requiring dialysis via temporary R HD line   -Family reports no hx of CKD. Reportedly developed QI during hospital stay thought due to AIN from vanc vs ATN   -Pt underwent first dialysis 11/21. A second session performed 11/22.   -Repeat  labs here   -Maintain oleary, strict I/Os, avoid nephro toxins  -Renal consult     #COPD  -continue home inhalers     #Hx VTE  -previously on xarelto at home   -Given need for biopsy, will hold on therapeutic anticoagulation     Diet: NPO pending speech evaluation   Access: R temp HD line, anirudh, PIV   DVT ppx: SQH     Dispo: PT/OT eval     Jocelyn Lloyd MD

## 2023-11-24 LAB
ABO GROUP (TYPE) IN BLOOD: NORMAL
ALBUMIN SERPL BCP-MCNC: 3.1 G/DL (ref 3.4–5)
ALP SERPL-CCNC: 400 U/L (ref 33–136)
ALT SERPL W P-5'-P-CCNC: 125 U/L (ref 10–52)
ANION GAP SERPL CALC-SCNC: 24 MMOL/L (ref 10–20)
APTT PPP: 172 SECONDS (ref 27–38)
AST SERPL W P-5'-P-CCNC: 109 U/L (ref 9–39)
BASOPHILS # BLD AUTO: 0.01 X10*3/UL (ref 0–0.1)
BASOPHILS NFR BLD AUTO: 0.1 %
BILIRUB SERPL-MCNC: 7.7 MG/DL (ref 0–1.2)
BUN SERPL-MCNC: 63 MG/DL (ref 6–23)
CALCIUM SERPL-MCNC: 8.5 MG/DL (ref 8.6–10.6)
CHLORIDE SERPL-SCNC: 101 MMOL/L (ref 98–107)
CK MB CFR SERPL CALC: 1 %MB OF CK
CK MB SERPL-CCNC: 1 NG/ML
CK SERPL-CCNC: 173 U/L (ref 0–325)
CO2 SERPL-SCNC: 22 MMOL/L (ref 21–32)
CREAT SERPL-MCNC: 5.75 MG/DL (ref 0.5–1.3)
CREAT UR-MCNC: 54.5 MG/DL (ref 20–370)
EOSINOPHIL # BLD AUTO: 0.09 X10*3/UL (ref 0–0.4)
EOSINOPHIL NFR BLD AUTO: 1.1 %
ERYTHROCYTE [DISTWIDTH] IN BLOOD BY AUTOMATED COUNT: 17.9 % (ref 11.5–14.5)
GFR SERPL CREATININE-BSD FRML MDRD: 9 ML/MIN/1.73M*2
GLUCOSE BLD MANUAL STRIP-MCNC: 140 MG/DL (ref 74–99)
GLUCOSE BLD MANUAL STRIP-MCNC: 144 MG/DL (ref 74–99)
GLUCOSE BLD MANUAL STRIP-MCNC: 145 MG/DL (ref 74–99)
GLUCOSE BLD MANUAL STRIP-MCNC: 148 MG/DL (ref 74–99)
GLUCOSE BLD MANUAL STRIP-MCNC: 170 MG/DL (ref 74–99)
GLUCOSE BLD MANUAL STRIP-MCNC: 175 MG/DL (ref 74–99)
GLUCOSE SERPL-MCNC: 139 MG/DL (ref 74–99)
HCT VFR BLD AUTO: 29 % (ref 41–52)
HGB BLD-MCNC: 9.5 G/DL (ref 13.5–17.5)
IMM GRANULOCYTES # BLD AUTO: 0.04 X10*3/UL (ref 0–0.5)
IMM GRANULOCYTES NFR BLD AUTO: 0.5 % (ref 0–0.9)
INR PPP: 4.2 (ref 0.9–1.1)
LDH SERPL L TO P-CCNC: 565 U/L (ref 84–246)
LYMPHOCYTES # BLD AUTO: 0.48 X10*3/UL (ref 0.8–3)
LYMPHOCYTES NFR BLD AUTO: 5.9 %
MAGNESIUM SERPL-MCNC: 2.3 MG/DL (ref 1.6–2.4)
MCH RBC QN AUTO: 27.7 PG (ref 26–34)
MCHC RBC AUTO-ENTMCNC: 32.8 G/DL (ref 32–36)
MCV RBC AUTO: 85 FL (ref 80–100)
MONOCYTES # BLD AUTO: 0.59 X10*3/UL (ref 0.05–0.8)
MONOCYTES NFR BLD AUTO: 7.2 %
NEUTROPHILS # BLD AUTO: 6.94 X10*3/UL (ref 1.6–5.5)
NEUTROPHILS NFR BLD AUTO: 85.2 %
NRBC BLD-RTO: 0 /100 WBCS (ref 0–0)
PLATELET # BLD AUTO: 234 X10*3/UL (ref 150–450)
POTASSIUM SERPL-SCNC: 5.1 MMOL/L (ref 3.5–5.3)
PROT SERPL-MCNC: 7.1 G/DL (ref 6.4–8.2)
PROT UR-ACNC: 124 MG/DL (ref 5–25)
PROT/CREAT UR: 2.28 MG/MG CREAT (ref 0–0.17)
PROTHROMBIN TIME: 47.7 SECONDS (ref 9.8–12.8)
RBC # BLD AUTO: 3.43 X10*6/UL (ref 4.5–5.9)
RH FACTOR (ANTIGEN D): NORMAL
SODIUM SERPL-SCNC: 142 MMOL/L (ref 136–145)
WBC # BLD AUTO: 8.2 X10*3/UL (ref 4.4–11.3)

## 2023-11-24 PROCEDURE — 82550 ASSAY OF CK (CPK): CPT | Performed by: STUDENT IN AN ORGANIZED HEALTH CARE EDUCATION/TRAINING PROGRAM

## 2023-11-24 PROCEDURE — P9017 PLASMA 1 DONOR FRZ W/IN 8 HR: HCPCS

## 2023-11-24 PROCEDURE — 2500000002 HC RX 250 W HCPCS SELF ADMINISTERED DRUGS (ALT 637 FOR MEDICARE OP, ALT 636 FOR OP/ED): Performed by: STUDENT IN AN ORGANIZED HEALTH CARE EDUCATION/TRAINING PROGRAM

## 2023-11-24 PROCEDURE — 36430 TRANSFUSION BLD/BLD COMPNT: CPT

## 2023-11-24 PROCEDURE — 99223 1ST HOSP IP/OBS HIGH 75: CPT

## 2023-11-24 PROCEDURE — 2500000002 HC RX 250 W HCPCS SELF ADMINISTERED DRUGS (ALT 637 FOR MEDICARE OP, ALT 636 FOR OP/ED)

## 2023-11-24 PROCEDURE — 84156 ASSAY OF PROTEIN URINE: CPT | Performed by: INTERNAL MEDICINE

## 2023-11-24 PROCEDURE — 94640 AIRWAY INHALATION TREATMENT: CPT

## 2023-11-24 PROCEDURE — 83615 LACTATE (LD) (LDH) ENZYME: CPT | Performed by: STUDENT IN AN ORGANIZED HEALTH CARE EDUCATION/TRAINING PROGRAM

## 2023-11-24 PROCEDURE — 36415 COLL VENOUS BLD VENIPUNCTURE: CPT

## 2023-11-24 PROCEDURE — 85610 PROTHROMBIN TIME: CPT | Performed by: STUDENT IN AN ORGANIZED HEALTH CARE EDUCATION/TRAINING PROGRAM

## 2023-11-24 PROCEDURE — 85611 PROTHROMBIN TEST: CPT | Performed by: STUDENT IN AN ORGANIZED HEALTH CARE EDUCATION/TRAINING PROGRAM

## 2023-11-24 PROCEDURE — 2500000004 HC RX 250 GENERAL PHARMACY W/ HCPCS (ALT 636 FOR OP/ED)

## 2023-11-24 PROCEDURE — 85025 COMPLETE CBC W/AUTO DIFF WBC: CPT

## 2023-11-24 PROCEDURE — 36415 COLL VENOUS BLD VENIPUNCTURE: CPT | Performed by: STUDENT IN AN ORGANIZED HEALTH CARE EDUCATION/TRAINING PROGRAM

## 2023-11-24 PROCEDURE — 83735 ASSAY OF MAGNESIUM: CPT

## 2023-11-24 PROCEDURE — 85730 THROMBOPLASTIN TIME PARTIAL: CPT

## 2023-11-24 PROCEDURE — 99222 1ST HOSP IP/OBS MODERATE 55: CPT | Performed by: INTERNAL MEDICINE

## 2023-11-24 PROCEDURE — 85670 THROMBIN TIME PLASMA: CPT | Performed by: STUDENT IN AN ORGANIZED HEALTH CARE EDUCATION/TRAINING PROGRAM

## 2023-11-24 PROCEDURE — 85732 THROMBOPLASTIN TIME PARTIAL: CPT | Performed by: STUDENT IN AN ORGANIZED HEALTH CARE EDUCATION/TRAINING PROGRAM

## 2023-11-24 PROCEDURE — 80053 COMPREHEN METABOLIC PANEL: CPT

## 2023-11-24 PROCEDURE — 31720 CLEARANCE OF AIRWAYS: CPT

## 2023-11-24 PROCEDURE — C9113 INJ PANTOPRAZOLE SODIUM, VIA: HCPCS

## 2023-11-24 PROCEDURE — 82947 ASSAY GLUCOSE BLOOD QUANT: CPT

## 2023-11-24 PROCEDURE — 96372 THER/PROPH/DIAG INJ SC/IM: CPT

## 2023-11-24 PROCEDURE — 85390 FIBRINOLYSINS SCREEN I&R: CPT | Performed by: STUDENT IN AN ORGANIZED HEALTH CARE EDUCATION/TRAINING PROGRAM

## 2023-11-24 PROCEDURE — 82553 CREATINE MB FRACTION: CPT | Performed by: STUDENT IN AN ORGANIZED HEALTH CARE EDUCATION/TRAINING PROGRAM

## 2023-11-24 PROCEDURE — 92610 EVALUATE SWALLOWING FUNCTION: CPT | Mod: GN

## 2023-11-24 PROCEDURE — 99223 1ST HOSP IP/OBS HIGH 75: CPT | Performed by: INTERNAL MEDICINE

## 2023-11-24 PROCEDURE — 2500000004 HC RX 250 GENERAL PHARMACY W/ HCPCS (ALT 636 FOR OP/ED): Performed by: STUDENT IN AN ORGANIZED HEALTH CARE EDUCATION/TRAINING PROGRAM

## 2023-11-24 PROCEDURE — 1210000001 HC SEMI-PRIVATE ROOM DAILY

## 2023-11-24 PROCEDURE — 85730 THROMBOPLASTIN TIME PARTIAL: CPT | Performed by: STUDENT IN AN ORGANIZED HEALTH CARE EDUCATION/TRAINING PROGRAM

## 2023-11-24 RX ORDER — FUROSEMIDE 10 MG/ML
40 INJECTION INTRAMUSCULAR; INTRAVENOUS ONCE
Status: DISCONTINUED | OUTPATIENT
Start: 2023-11-24 | End: 2023-11-24

## 2023-11-24 RX ORDER — IPRATROPIUM BROMIDE AND ALBUTEROL SULFATE 2.5; .5 MG/3ML; MG/3ML
3 SOLUTION RESPIRATORY (INHALATION)
Status: DISCONTINUED | OUTPATIENT
Start: 2023-11-24 | End: 2023-11-26 | Stop reason: HOSPADM

## 2023-11-24 RX ORDER — PANTOPRAZOLE SODIUM 40 MG/10ML
40 INJECTION, POWDER, LYOPHILIZED, FOR SOLUTION INTRAVENOUS DAILY
Status: DISCONTINUED | OUTPATIENT
Start: 2023-11-24 | End: 2023-11-25

## 2023-11-24 RX ADMIN — PANTOPRAZOLE SODIUM 40 MG: 40 INJECTION, POWDER, FOR SOLUTION INTRAVENOUS at 10:26

## 2023-11-24 RX ADMIN — IPRATROPIUM BROMIDE AND ALBUTEROL SULFATE 3 ML: .5; 3 SOLUTION RESPIRATORY (INHALATION) at 00:16

## 2023-11-24 RX ADMIN — INSULIN LISPRO 1 UNITS: 100 INJECTION, SOLUTION INTRAVENOUS; SUBCUTANEOUS at 04:50

## 2023-11-24 RX ADMIN — IPRATROPIUM BROMIDE AND ALBUTEROL SULFATE 3 ML: .5; 3 SOLUTION RESPIRATORY (INHALATION) at 22:07

## 2023-11-24 RX ADMIN — HEPARIN SODIUM 5000 UNITS: 5000 INJECTION INTRAVENOUS; SUBCUTANEOUS at 00:14

## 2023-11-24 RX ADMIN — HEPARIN SODIUM 5000 UNITS: 5000 INJECTION INTRAVENOUS; SUBCUTANEOUS at 10:26

## 2023-11-24 RX ADMIN — HEPARIN SODIUM 5000 UNITS: 5000 INJECTION INTRAVENOUS; SUBCUTANEOUS at 18:15

## 2023-11-24 RX ADMIN — IPRATROPIUM BROMIDE AND ALBUTEROL SULFATE 3 ML: .5; 3 SOLUTION RESPIRATORY (INHALATION) at 14:49

## 2023-11-24 RX ADMIN — IPRATROPIUM BROMIDE AND ALBUTEROL SULFATE 3 ML: .5; 3 SOLUTION RESPIRATORY (INHALATION) at 09:29

## 2023-11-24 RX ADMIN — PHYTONADIONE 10 MG: 10 INJECTION, EMULSION INTRAMUSCULAR; INTRAVENOUS; SUBCUTANEOUS at 14:35

## 2023-11-24 RX ADMIN — CEFTRIAXONE SODIUM 2 G: 2 INJECTION, SOLUTION INTRAVENOUS at 14:35

## 2023-11-24 ASSESSMENT — ENCOUNTER SYMPTOMS
ABDOMINAL PAIN: 0
DIZZINESS: 0
COUGH: 0
SHORTNESS OF BREATH: 0
FEVER: 0
CHILLS: 0

## 2023-11-24 ASSESSMENT — COGNITIVE AND FUNCTIONAL STATUS - GENERAL
MOVING TO AND FROM BED TO CHAIR: TOTAL
DAILY ACTIVITIY SCORE: 10
MOVING FROM LYING ON BACK TO SITTING ON SIDE OF FLAT BED WITH BEDRAILS: A LOT
DRESSING REGULAR LOWER BODY CLOTHING: A LOT
DRESSING REGULAR UPPER BODY CLOTHING: A LOT
TOILETING: TOTAL
TURNING FROM BACK TO SIDE WHILE IN FLAT BAD: A LOT
EATING MEALS: A LOT
WALKING IN HOSPITAL ROOM: TOTAL
PERSONAL GROOMING: A LOT
STANDING UP FROM CHAIR USING ARMS: TOTAL
HELP NEEDED FOR BATHING: TOTAL

## 2023-11-24 ASSESSMENT — PAIN SCALES - PAIN ASSESSMENT IN ADVANCED DEMENTIA (PAINAD)
BODYLANGUAGE: RELAXED
FACIALEXPRESSION: SMILING OR INEXPRESSIVE
TOTALSCORE: 0
CONSOLABILITY: NO NEED TO CONSOLE
BREATHING: NORMAL

## 2023-11-24 ASSESSMENT — PAIN SCALES - WONG BAKER
WONGBAKER_NUMERICALRESPONSE: NO HURT
WONGBAKER_NUMERICALRESPONSE: NO HURT

## 2023-11-24 ASSESSMENT — PAIN - FUNCTIONAL ASSESSMENT: PAIN_FUNCTIONAL_ASSESSMENT: 0-10

## 2023-11-24 ASSESSMENT — PAIN SCALES - GENERAL
PAINLEVEL_OUTOF10: 0 - NO PAIN

## 2023-11-24 NOTE — CARE PLAN
The patient's goals for the shift include      The clinical goals for the shift include pt will finish plasma transfusion        Problem: Fall/Injury  Goal: Verbalize understanding of personal risk factors for fall in the hospital  Outcome: Progressing  Goal: Verbalize understanding of risk factor reduction measures to prevent injury from fall in the home  Outcome: Progressing  Goal: Use assistive devices by end of the shift  Outcome: Progressing     Problem: Skin  Goal: Prevent/minimize sheer/friction injuries  Outcome: Progressing  Goal: Promote skin healing  Outcome: Progressing     Problem: Psychosocial Needs  Goal: Demonstrates ability to cope with hospitalization/illness  Outcome: Progressing  Goal: Collaborate with me, my family, and caregiver to identify my specific goals  Outcome: Progressing     Problem: Discharge Barriers  Goal: My discharge needs are met  Outcome: Progressing     Problem: Discharge Planning  Goal: Discharge to home or other facility with appropriate resources  Outcome: Progressing     Problem: Chronic Conditions and Co-morbidities  Goal: Patient's chronic conditions and co-morbidity symptoms are monitored and maintained or improved  Outcome: Progressing

## 2023-11-24 NOTE — CONSULTS
"SUPPORTIVE AND PALLIATIVE ONCOLOGY CONSULT    Inpatient consult to [facility] Adult Supportive Oncology  Consult performed by: Nguyen Cervantes, APRN-CNP  Consult ordered by: Omar Jimenez MD MPH        SERVICE DATE: 11/24/2023      PALLIATIVE MEDICINE OUTPATIENT PROVIDER:  None  CURRENT ATTENDING PROVIDER: Omar Jimenez MD *     Medical Oncologist: No care team member to display   Radiation Oncologist: No care team member to display  Primary Physician: No primary care provider on file.  None    REASON FOR CONSULT/CHIEF CONSULT COMPLAINT: Introduction to Supportive and Palliative Oncology Services    Subjective   HISTORY OF PRESENT ILLNESS: Asad Vines is a 78 y.o. 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 Now transferred to [facility] on 11/23 for further GI evaluation of pancreatic mass.  Course complicated by altered mental status; unstable coagulation studies.. Supportive and Palliative Oncology is consulted for Introduction to Supportive and Palliative Oncology Services (family requested information on support/services provided while they await official diagnosis but are aware dx is likely cancer)    From H&P on 11/23/23 (Dr. Shaw Gomez-who spoke with pt's son, Anselmo):    \"Pt's son reports while in the hospital ([facility]) he had a complicated course and his cognitive function has since declined.  He states that his father retired at the age of 60 from his job as a  and now lives with the son who is the primary caregiver.  While at [facility], the patient was in the ICU and he was managed for pneumonia, urinary tract infection, bloodstream infection, kidney " "failure, and was being worked up for a pancreatic mass which was suspected to be malignant in nature.  [facility] could not provide any further workup with regards to the pancreatic mass which is what prompted transfer to [facility].    Son is his caregiver. Pt at baseline is able to transfer from bed to wheelchair independently and to bathroom. Pt had been working with physical therapy and had overall been in his usual state of health. At the end of October, he developed symptoms of confusion and incontinence and presented to the hospital where he was diagnosed with UTI and COVID infection. During this hospitalization, CT incidentally found a pancreas mass for which they were planning to follow up outpatient. Son denies his father had symptoms of weight loss, jaundice, or abdominal pain at the time. He was discharged on oral antibiotics for the UTI however symptoms worsened leading him to re-present in November at [facility].\"    C discussed with family on . While awaiting diagnosis, family would like to consider options other than treatment such as palliative care, hospice.      Pain Assessment:  Pt denies pain    Opioid Use  No use of opioids upon chart review    OARRS/PDMP reviewed no aberrant behavior noted.    Symptom Assessment:  Unable to obtain secondary to altered mental status (A&O x 1-2)    Information obtained from: chart review and discussion with primary team  ______________________________________________________________________     Oncology History    No history exists.       No past medical history on file.  No past surgical history on file.  No family history on file.     SOCIAL HISTORY:  States his wife  8 years ago after 50 years of marriage  Children: 2 adult children (from discussion with primary team: 1) son Anselmo [phone]; 2) [phone]   Social History: unable to obtain d/t altered mental status    REVIEW OF SYSTEMS:  Review of systems negative unless " noted in HPI.       Objective       Lab Results   Component Value Date    WBC 8.2 11/24/2023    HGB 9.5 (L) 11/24/2023    HCT 29.0 (L) 11/24/2023    MCV 85 11/24/2023     11/24/2023      Lab Results   Component Value Date    GLUCOSE 139 (H) 11/24/2023    CALCIUM 8.5 (L) 11/24/2023     11/24/2023    K 5.1 11/24/2023    CO2 22 11/24/2023     11/24/2023    BUN 63 (H) 11/24/2023    CREATININE 5.75 (H) 11/24/2023     Lab Results   Component Value Date     (H) 11/24/2023     (H) 11/24/2023    ALKPHOS 400 (H) 11/24/2023    BILITOT 7.7 (H) 11/24/2023     Estimated Creatinine Clearance: 8.2 mL/min (A) (by C-G formula based on SCr of 5.75 mg/dL (H)).     No current outpatient medications  Scheduled medications   cefTRIAXone, 2 g, intravenous, q24h  heparin (porcine), 5,000 Units, subcutaneous, q8h  insulin lispro, 0-5 Units, subcutaneous, q4h  ipratropium-albuteroL, 3 mL, nebulization, TID  melatonin, 3 mg, oral, Nightly  pantoprazole, 40 mg, intravenous, Daily  phytonadione, 10 mg, intravenous, Daily  polyethylene glycol, 17 g, oral, Daily  sertraline, 100 mg, oral, Nightly  tamsulosin, 0.4 mg, oral, Daily      Continuous medications     PRN medications  PRN medications: dextrose 10 % in water (D10W), dextrose, glucagon, oxygen     Allergies:   Allergies   Allergen Reactions    Penicillins Rash             XR chest 1 view    Result Date: 11/23/2023  FINDINGS: AP radiograph of the chest Right IJ approach central venous/hemodialysis catheter with tip overlying mid SVC.   CARDIOMEDIASTINAL SILHOUETTE: The cardiomediastinal silhouette is stable in size and configuration. Aortic knob calcifications are seen.   LUNGS: Coarse airspace opacities along bilateral lung bases, left more than right. There is no pleural effusion or pneumothorax.   ABDOMEN: No remarkable upper abdominal findings.   BONES: No acute osseous abnormality.       1. Coarse airspace opacities along bilateral lung bases, left more  than right. Correlate with concern for infection/aspiration. 2. Right IJ approach central venous/hemodialysis catheter with tip overlying mid SVC.   Signed by: Romaine Gong 11/23/2023 2:50 PM Dictation workstation:   [id number]       PHYSICAL EXAMINATION:  Vital Signs:   Vital signs reviewed  Vitals:    11/24/23 1133   BP: 98/57   Pulse: 90   Resp:    Temp: 37 °C (98.6 °F)   SpO2: 93%     Pain Score: 0 - No pain     Physical Exam  Vitals and nursing note reviewed.   Constitutional:       General: He is not in acute distress.     Appearance: He is ill-appearing.      Comments: thin   Cardiovascular:      Rate and Rhythm: Normal rate and regular rhythm.      Pulses: Normal pulses.      Heart sounds: Normal heart sounds.   Pulmonary:      Effort: Pulmonary effort is normal.      Breath sounds: Normal breath sounds.      Comments: On room air; no use of accessory muscles;   Abdominal:      General: Abdomen is flat. Bowel sounds are normal.      Palpations: Abdomen is soft.   Musculoskeletal:         General: No swelling.      Comments: Right AKA   Skin:     General: Skin is warm and dry.      Capillary Refill: Capillary refill takes less than 2 seconds.   Neurological:      Mental Status: He is alert. He is disoriented.   Psychiatric:         Mood and Affect: Mood normal.      Comments: Pleasant and cooperative; alert and oriented x 1-2       ASSESSMENT/PLAN:  Asad Vines is a 78 y.o. 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 Now transferred to [facility] on 11/23 for further GI evaluation of pancreatic mass.  Course complicated by altered mental status; unstable coagulation  "studies.. Supportive and Palliative Oncology is consulted for Introduction to Supportive and Palliative Oncology Services (family requested information on support/services provided while they await official diagnosis but are aware dx is likely malignant)    Nausea:  At risk for nausea without vomiting related to  abdominal malignancy    Home regimen:  none  Denies presently  Recommend Zofran 4 mg IV/PO q 6 hrs prn for nausea    Constipation  At risk for constipation related to likely GI malignancy  Usual bowel pattern: uable to determine in discussion with pt d/t AMS  Home regimen: none  Unknown LBM; pt states he thinks yesterday or a \"few days before that\"  Monitor bowel frequency  If no BM >3 days, please consider Miralax 17 g po daily or Senna 2 tabs po bid    Sleeping Difficulty:  Impaired sleep related to hospital environment and recent decline in health status  Home regimen:   unknown  Continue Melatonin 3 mg po q hs    Medical Decision Making/Goals of Care/Advance Care Planning:  Patient's current clinical condition, including diagnosis, prognosis, and management plan, and goals of care were discussed.   Life limiting disease:  likely GI malignancy (pancreatic)  Family: Supportive please inform family that we are happy to meet with them or call them about services we provide and compare to hospice on Monday. In person would be ideal.  Performance status: Major  limitations due to  altered mental status, recent overall decline in health  Joys/meaning/strength:  unable to obtain from pt  Understanding of health: will address with family    Code status discussion:  FULL per primary team H&P , primary team's discussion with family    Advance Directives  Existence of Advance Directives:Unknown  Decision maker: Surrogate decision maker is Son Anselmo [phone], daughter Christy: [phone]  Code Status: Full code    Introduction to Supportive and Palliative Oncology:  Spoke with patient at bedside-pleasantly " disoriented  Introduced the role and philosophy of Supportive and Palliative oncology in the evaluation and management of symptoms during cancer treatment  Palliative care was introduced as a service for patients with serious illness to help with symptoms, assist with goals of care conversations, navigate complex decision making, improve quality of life for patients, and provide support both patients and families.  Patient seemed to appreciate the extra layer of support.     Supportive Interventions: will discuss services available with family    Disposition:  Please  start the process of having prior authorization with meds to beds deliver medications to patient prior to discharge via [facility] pharmacy. Prescriptions will need to be sent 48-72 hours prior to discharge so that a prior authorization can be completed.     Discharge date: unknown pending acute issues and evaluation/diagnostic studies of pancreatic mass when stable  Will assess if patient needs an appointment with Outpatient Supportive Oncology as appropriate      Signature and billing:  Thank you for allowing us to participate in the care of this patient. Recommendations will be communicated back to the consulting service by way of shared electronic medical record or face-to-face.    Medical complexity was high level due to due to complexity of problems, extensive data review, and high risk of management/treatment.    I spent 75 minutes in the care of this patient which included chart review, interviewing patient/family, discussion with primary team, coordination of care, and documentation.      DATA   Diagnostic tests and information reviewed for today's visit:  Conversation with primary team, Most recent labs and imaging results, Medications       Some elements copied from Dr. Gomez's H&P note on 11/23/23, the elements have been updated and all reflect current decision making from today, 11/24/2023.    Plan of Care discussed with: Provider, RN,  Patient    Thank you for asking Supportive and Palliative Oncology to assist with care of this patient.  We will continue to follow.  Please contact us for additional questions or concerns.      SIGNATURE: CHEAL Adames-CNP  PAGER/CONTACT:  Contact information:  Supportive and Palliative Oncology  Monday-Friday 8 AM-5 PM  Epic Secure chat or pager [phone].  After hours and weekends:  pager [phone]

## 2023-11-24 NOTE — CONSULTS
"NEPHROLOGY NEW CONSULT NOTE   Asad Vines   78 y.o.    @WT@  MRN/Room: [id number]    Reason for consult: QI    HPI:  Asad Vines is a 78 y.o. 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 Now transferred to [facility] on 11/23 for further GI evaluation of pancreatic mass.     While at [facility], he was treated for MRSA bacteremia, no endocarditis on workup. Hospital course c/b aspiration pna requiring intubation, septic shock. During this time he developed QI for which nephrology was consulted for. Thought to be due to AIN (from vancomycin/daptomycin) vs ATN due to shock. His creatinine started to uptrend to Cr 6.1, K 5.2 so he got dialysis on 11/21 and 11/22. Renal US showed normal R kidney and L mild pelvocaliectasis, pt has oleary catheter.   He was also given Daptomycin, Meropenem which were stopped 11/22 and started on Ceftriaxone.     Pt presents with Cr 4.99, uptrend to 5.75 today. Electrolytes stable. UA with 2+ proteinuria, >20 RBC, 6-10 WBC. Hemodynamics stable. ~800cc UOP since overnight via oleary. Patient denies any symptoms. Has mumbled speech so difficult to obtain much history. Per son he has no history of CKD and has not seen a nephrologist in the past.           In The ER: BP 98/57   Pulse 90   Temp 37 °C (98.6 °F)   Resp 18   Ht 1.676 m (5' 5.98\")   Wt 56.7 kg (125 lb)   SpO2 93%   BMI 20.19 kg/m²      No past medical history on file.   No past surgical history on file.   No family history on file.  Social History     Socioeconomic History    Marital status: Unknown     Spouse name: Not on file    Number of children: " Not on file    Years of education: Not on file    Highest education level: Not on file   Occupational History    Not on file   Tobacco Use    Smoking status: Former     Types: Cigarettes     Passive exposure: Past    Smokeless tobacco: Never   Vaping Use    Vaping Use: Not on file   Substance and Sexual Activity    Alcohol use: Not on file    Drug use: Not on file    Sexual activity: Not on file   Other Topics Concern    Not on file   Social History Narrative    Not on file     Social Determinants of Health     Financial Resource Strain: Unknown (11/23/2023)    Overall Financial Resource Strain (CARDIA)     Difficulty of Paying Living Expenses: Patient refused   Food Insecurity: Not on file   Transportation Needs: No Transportation Needs (11/23/2023)    PRAPARE - Transportation     Lack of Transportation (Medical): No     Lack of Transportation (Non-Medical): No   Physical Activity: Not on file   Stress: Not on file   Social Connections: Not on file   Intimate Partner Violence: Not on file   Housing Stability: Unknown (11/23/2023)    Housing Stability Vital Sign     Unable to Pay for Housing in the Last Year: Patient refused     Number of Places Lived in the Last Year: 1     Unstable Housing in the Last Year: No       Allergies   Allergen Reactions    Penicillins Rash        No medications prior to admission.        Meds:   cefTRIAXone, 2 g, q24h  heparin (porcine), 5,000 Units, q8h  insulin lispro, 0-5 Units, q4h  ipratropium-albuteroL, 3 mL, TID  melatonin, 3 mg, Nightly  pantoprazole, 40 mg, Daily  phytonadione, 10 mg, Daily  polyethylene glycol, 17 g, Daily  sertraline, 100 mg, Nightly  tamsulosin, 0.4 mg, Daily         dextrose 10 % in water (D10W), 0.3 g/kg/hr, Once PRN  dextrose, 25 g, q15 min PRN  glucagon, 1 mg, q15 min PRN  oxygen, , Continuous PRN - O2/gases        Vitals:    11/24/23 1133   BP: 98/57   Pulse: 90   Resp:    Temp: 37 °C (98.6 °F)   SpO2: 93%        11/22 1900 - 11/24 0659  In: 327   Out:  450 [Urine:450]   Weight change:      General appearance: AAOx3. Gargled speech  Heart: RRR  Lungs: CTA bilat.    Abdomen: soft, nt/nd  Extremities: No edema bilat  :  Lee  Neuro: No FND  ACCESS: TDC      ASSESSMENT:  Asad Vines is a 78 y.o. 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 Admitted and treated for septic shock, AHRF requiring intubation due to asp pna with QI requiring hemodialysis. Patient transferred to [facility] on 11/23 for further GI evaluation of pancreatic mass.       #QI-D, nonoliguric secondary to ischemic ATN likely from septic shock with concern for AIN (daptomycin, vancomycin, meropenem stopped 11/22).  -UA: 2+ proteinuria, >20 RBC, 6-10 WBC  -unknown baseline, last HD 11/22  -non oliguric, ~800cc UOP in last 24h  -volume: mild hypervolemia  -electrolytes: stable     #MRSA bacteremia, on Ceftriaxone    Recommendations:  - Given his uptrend in creatinine patient may need further hemodialysis however with improving urine output will continue to evaluate. No urgent needs for renal replacement therapy today  - Would recommend lasix IV 40mg given mild vascular congestion on CXR and he remains non oliguric   - Avoid nephrotoxins, contrast if possible  - strict Is/Os  - Renal dosing for medications for latest eGFR, follow medication trough as appropriate  - Avoid hypotension/rapid fluctuations in BPs    Noemy Cartwright DO  Nephrology Fellow  24 hour Renal Pager - [phone]    Discussed with attending nephrologist

## 2023-11-24 NOTE — CONSULTS
"Nutrition   Nutrition Assessment    Reason for Assessment: Provider consult order  Asad Vines is a 78 y.o. 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 Now transferred to [facility] on 11/23 for further GI evaluation of pancreatic mass.     Nutrition History:  Food and Nutrient History: Patient reports eating 3 meals a day at baseline and reports good appetite PTA. Denies nausea. Denies constipation. SLP assessed patient earlier this morning and recommended NPO.     Food Allergies/Intolerances:  None  GI Symptoms: None   Oral Problems: Swallowing difficulty       Anthropometrics:  Height: 167.6 cm (5' 5.98\")   Weight: 56.7 kg (125 lb)   BMI (Calculated): 20.19  IBW/kg (Dietitian Calculated): 60.7 kg - adjusted for R AKA.   Percent of IBW: 93 %  Adjusted Body Weight (kg): 60.7 kg  Weight History:   Wt Readings from Last 10 Encounters:   11/24/23 56.7 kg (125 lb)      No wt hx in EMR.  Per H&P, patient hospitalized at [facility]SH at the end of October (diagnosed with UTI and COVID infection and incidental finding of a pancreas mass). At that time patient's son denied his father had symptoms of weight loss. Patient was discharged, but presented to OSH again in Nov. Unsure of wt status at that time.    Nutrition Focused Physical Exam Findings:  Subcutaneous Fat Loss:   Orbital Fat Pads: Mild-Moderate (slight dark circles and slight hollowing)  Buccal Fat Pads: Mild-Moderate (flat cheeks, minimal bounce)  Triceps: Well nourished (ample fat tissue)  Ribs: Well nourished (full chest, ribs do not protrude)  Muscle Wasting:  Temporalis: Mild-Moderate (slight " "depression)  Pectoralis (Clavicular Region): Mild-Moderate (some protrusion of clavicle)  Deltoid/Trapezius: Mild-Moderate (slight protrusion of acromion process)  Interosseous: Well nourished (muscle bulges)  Edema:  Edema: none  Physical Findings:  Hair: Negative  Eyes: Negative  Mouth: Positive (tongue discolored r/t device? previous tubing?)  Nails: Negative  Skin: Negative    Nutrition Significant Labs:  CBC Trend:   Results from last 7 days   Lab Units 11/24/23  0622 11/23/23  1716   WBC AUTO x10*3/uL 8.2 8.1   RBC AUTO x10*6/uL 3.43* 3.69*   HEMOGLOBIN g/dL 9.5* 10.2*   HEMATOCRIT % 29.0* 31.5*   MCV fL 85 85   PLATELETS AUTO x10*3/uL 234 252    , BMP Trend:   Results from last 7 days   Lab Units 11/24/23  0622 11/23/23  1716   GLUCOSE mg/dL 139* 157*   CALCIUM mg/dL 8.5* 8.8   SODIUM mmol/L 142 139   POTASSIUM mmol/L 5.1 4.7   CO2 mmol/L 22 26   CHLORIDE mmol/L 101 100   BUN mg/dL 63* 58*   CREATININE mg/dL 5.75* 4.99*    , A1C:No results found for: \"HGBA1C\", BG POCT trend:   Results from last 7 days   Lab Units 11/24/23  1136 11/24/23  0818 11/24/23  0402 11/24/23  0011 11/23/23  2102   POCT GLUCOSE mg/dL 145* 140* 170* 144* 145*    , Liver Function Trend:   Results from last 7 days   Lab Units 11/24/23  0622 11/23/23  1716   ALK PHOS U/L 400* 415*   AST U/L 109* 113*   ALT U/L 125* 134*   BILIRUBIN TOTAL mg/dL 7.7* 6.7*      Nutrition Specific Medications:    Scheduled medications  cefTRIAXone, 2 g, intravenous, q24h  insulin lispro, 0-5 Units, subcutaneous, q4h  pantoprazole, 40 mg, intravenous, Daily  phytonadione, 10 mg, intravenous, Daily  polyethylene glycol, 17 g, oral, Daily    PRN medications  PRN medications: dextrose 10 % in water (D10W), dextrose, glucagon, oxygen    I/O:    No bm recorded.    Dietary Orders (From admission, onward)       Start     Ordered    11/23/23 1515  NPO Diet; Effective now  Diet effective now         11/23/23 1514                     Estimated Needs:   Total Energy " Estimated Needs (kCal): 1700 kCal  Method for Estimating Needs: 56.7kg/30kcal  Total Protein Estimated Needs (g): 70 g  Method for Estimating Needs: 56.7kg/1.2g  Total Fluid Estimated Needs (mL): 1700 mL  Method for Estimating Needs: 1ml/kcal        Nutrition Diagnosis   Nutrition Diagnosis:  Unable to classify as malnourished without wt/diet hx.    Nutrition Diagnosis  Patient has Nutrition Diagnosis: Yes  Nutrition Diagnosis 1: Swallowing difficulity  Related to (1): acute illness?  As Evidenced by (1): failed bedside swallow eval with SLP this morning; deemed high aspiration risk.       Nutrition Interventions/Recommendations   Nutrition Interventions and Recommendations:        Nutrition Prescription:  Individualized Nutrition Prescription Provided for :   If Dobhoff to be placed, please obtain Phos and Mag as patient may be refeeding risk.   Consider Thiamine 100mg x 7 days.    If lytes wnl, start TF of Isosource 1.5 at 20ml/hr and increase by 20ml every 6-8 hours as long as elytes wnl, to goal of 45ml/hr. (1620 kcal, 73g protein, 825ml of free water in 1080 total volume).         Nutrition Monitoring and Evaluation   Monitoring/Evaluation:   Food/Nutrient Related History Monitoring  Monitoring and Evaluation Plan: Enteral and parenteral nutrition intake  Enteral and Parenteral Nutrition Intake: Enteral nutrition intake    Body Composition/Growth/Weight History  Monitoring and Evaluation Plan: Weight    Biochemical Data, Medical Tests and Procedures  Monitoring and Evaluation Plan: Glucose/endocrine profile, Electrolyte/renal panel  Electrolyte and Renal Panel: Magnesium, Phosphorus       Time Spent/Follow-up Reminder:   Follow Up  Time Spent (min): 60 minutes  Last Date of Nutrition Visit: 11/24/23  Nutrition Follow-Up Needed?: Dietitian to reassess per policy

## 2023-11-24 NOTE — CONSULTS
Inpatient consult to Infectious Diseases  Consult performed by: Angel Jackson MD  Consult ordered by: Omar Jimenez MD MPH      Referred by Dr Jimenez    Primary MD: No primary care provider on file.    Reason For Consult  MRSA bacteremia    History Of Present Illness  Asad Vines is a 78 y.o. male presenting for pancreas mass and bacteremia    Has hx of COPD. DVT on xarelto,  bladder ca , CAD and PAD.   He presented to  [facility] on 11/6 with shortness of breath and weakness.   Found to have sepsis , QI , respiratory failure with pneumonia.   Blood culture positive with MRSA,  urine culture also had MRSA.  Was put on vancomycin but his kidney function continued to decline and vancomycin was switched to daptomycin.    Ended up getting dialysis on 11/21 and 11/22.  Had trans- esophageal echocardiogram which showed no endocarditis.  Tagged white blood cell count was unremarkable.   On 11/18,  he developed acute hypoxia from possible aspiration and was intubated with pressor support until 11/23.   He had jaundice with elevated LFT and scan showed pancreas mass .  They could not do ERCP due to coagulopathy.  Decision was made to transfer him here for further work up for pancreas mass.    He was planning to CONTINUE daptomycin for 6 weeks until 12/21 and ceftriaxone for klebsiella pneumonia for 7 days until 11/25 .       Past Medical History  He has no past medical history on file.    Surgical History  He has no past surgical history on file.     Social History     Occupational History    Not on file   Tobacco Use    Smoking status: Former     Types: Cigarettes     Passive exposure: Past    Smokeless tobacco: Never   Vaping Use    Vaping Use: Not on file   Substance and Sexual Activity    Alcohol use: Not on file    Drug use: Not on file    Sexual activity: Not on file     Travel History   Travel since 10/24/23    No documented travel since 10/24/23            Pets: unknown  Hobbies:  "unknown    Family History  No family history on file.  Allergies  Penicillins       There is no immunization history on file for this patient.  Medications  Home medications:  No medications prior to admission.     Current medications:  Scheduled medications  cefTRIAXone, 2 g, intravenous, q24h  heparin (porcine), 5,000 Units, subcutaneous, q8h  insulin lispro, 0-5 Units, subcutaneous, q4h  ipratropium-albuteroL, 3 mL, nebulization, q6h  melatonin, 3 mg, oral, Nightly  pantoprazole, 40 mg, intravenous, Daily  polyethylene glycol, 17 g, oral, Daily  sertraline, 100 mg, oral, Nightly  tamsulosin, 0.4 mg, oral, Daily      Continuous medications     PRN medications  PRN medications: dextrose 10 % in water (D10W), dextrose, glucagon    Review of Systems   Constitutional:  Negative for chills and fever.   Respiratory:  Negative for cough and shortness of breath.    Cardiovascular:  Negative for chest pain and leg swelling.   Gastrointestinal:  Negative for abdominal pain.   Neurological:  Negative for dizziness.        Objective  Range of Vitals (last 24 hours)  Heart Rate:  [85-91]   Temp:  [36.3 °C (97.3 °F)-37.1 °C (98.8 °F)]   Resp:  [15-18]   BP: (121-149)/(62-73)   Height:  [167.6 cm (5' 6\")]   Weight:  [56.7 kg (125 lb)]   SpO2:  [88 %-92 %]   Daily Weight  11/23/23 : 56.7 kg (125 lb)    Body mass index is 20.18 kg/m².     Physical Exam  Vitals reviewed.   Constitutional:       General: He is not in acute distress.     Appearance: He is not ill-appearing.   HENT:      Mouth/Throat:      Pharynx: Oropharynx is clear.   Cardiovascular:      Rate and Rhythm: Normal rate and regular rhythm.      Pulses: Normal pulses.   Pulmonary:      Effort: Pulmonary effort is normal.      Breath sounds: Normal breath sounds.   Abdominal:      General: Abdomen is flat. Bowel sounds are normal.      Palpations: Abdomen is soft.      Tenderness: There is no abdominal tenderness.   Neurological:      Mental Status: He is alert.      " Comments: Hard of hearing . Poor denture           Relevant Results  Outside Hospital Results  No  Labs  Results from last 72 hours   Lab Units 11/24/23  0622 11/23/23  1716   WBC AUTO x10*3/uL 8.2 8.1   HEMOGLOBIN g/dL 9.5* 10.2*   HEMATOCRIT % 29.0* 31.5*   PLATELETS AUTO x10*3/uL 234 252   NEUTROS PCT AUTO % 85.2 84.0   LYMPHS PCT AUTO % 5.9 7.4   MONOS PCT AUTO % 7.2 7.4   EOS PCT AUTO % 1.1 0.7     Results from last 72 hours   Lab Units 11/23/23  1716   SODIUM mmol/L 139   POTASSIUM mmol/L 4.7   CHLORIDE mmol/L 100   CO2 mmol/L 26   BUN mg/dL 58*   CREATININE mg/dL 4.99*   GLUCOSE mg/dL 157*   CALCIUM mg/dL 8.8   ANION GAP mmol/L 18   EGFR mL/min/1.73m*2 11*     Results from last 72 hours   Lab Units 11/23/23  1716   ALK PHOS U/L 415*   BILIRUBIN TOTAL mg/dL 6.7*   BILIRUBIN DIRECT mg/dL 4.8*   PROTEIN TOTAL g/dL 7.5   ALT U/L 134*   AST U/L 113*   ALBUMIN g/dL 3.2*       Temp Readings from Last 5 Encounters:   11/24/23 36.7 °C (98.1 °F)       Estimated Creatinine Clearance: 9.5 mL/min (A) (by C-G formula based on SCr of 4.99 mg/dL (H)).    Micro  Blood culture 11/6 - MRSA (sensitive: ceftaroline, ciprofloxacin, clindamycin, daptomycin, levofloxacin, linezolid, rifampin, tetracycline, vancomycin; resistant - azithromycin, cefepime, cefotaxime, ceftriaxone, erythromycin, imipenem, meropenem, oxacillin, penicillin)   Blood culture 11/7 - MRSA  Blood culture 11/9 - NGTD x 5 days   Blood culture 11/18 - NGTD x 5 days     Urine culture 10/25/23 - No growth  Urine culture 11/6/23  positive for >100,000 CFU MRSA and enterococcus faecalis (sensitive to ampicillin, gentimicin, vancomycin; resistant to levofloxacin, ciprofloxacin)      Tracheal aspirate culture 11/19/23 - klebsiella oxytoca (intermediate to cefazolin; sensitive to amikacin, ampicillin, cefotaxime, cefuroxime, ciprofloxacin, gent, imipenem, levofloxacin, meropenem, minocycline, piptazo, tetracycline), candida albicans, candida galbrata     Antibiotic  history   Vancomycin (11/7-11/16)   Daptomycin (11/19- )   Meropenem (11/19- ) with possible plans to transition to CTX for klebsiella based on sensitivities      INFECTIOUS SYNOPSIS AND ASSESSMENT    77yo male with hx of COPD, CAD, PAD was hospitalized at outside hospital from 11/6 to 11/24 for septic shock with MRSA bacteremia,  ventilator associated pneumonia , UTI and QI,    he was transferred to us for evaluation on his pancreas mass.    Urine culture grew MRSA and Enterococcus faecalis.   Respiratory culture from tracheal aspirate grew Klebsiella oxytoca.  Was on vancomycin for MRSA bacteremia and ceftriaxone for klebsiella pneumonia( until 11/25)  .  His vancomycin was switched to daptomycin due to kidney failure.  He is on dialysis now.  It is not clear why they planned to keep her on iv antibiotic for 6 weeks ( end date 12/21)  in absence of endocarditis or osteomyelitis.     Was also on prednisone for COPD flare.      RECOMMENDATION    SWITCH daptomycin to vancomycin on dialysis   CONTINUE ceftriaxone 2 gram a day         ID will follow in the morning.  The case was discussed with my attending , Dr. Pradeep Chase who agreed with my assessment and plan.    ARNULFO RENTERIA.  M.D /  ID consult TEAM B  Infectious disease fellow PGY-4  Reach me through [employer] instead of paging if possible ( especially during noon conference )  Or page me through Team B  [phone]

## 2023-11-24 NOTE — PROGRESS NOTES
"Asad Vines is a 78 y.o. male on day 1 of admission presenting with Pancreatic mass.    Subjective   Patient doing okay this morning.  Patient states that he is unsure why he is in the hospital and that he would like to leave.  Endorses that he is extremely thirsty and requests water.  He has dried blood surrounding his lips and has a muffled voice.  Spoke with son this morning.  Son states that he knows his father would want to spend his last days at home and he says \"I promised my father his last days would not be in the hospital.\"  Son would like our team to focus on improving patient's mental status and functional status.  Had in-depth conversation regarding patient's poor prognosis.  Engaged supportive oncology will speak to the son later this afternoon.  Will rediscuss CODE STATUS and goals of care with son after supportive oncology conversation.       Objective     Physical Exam  Constitutional:       Appearance: He is ill-appearing.   HENT:      Head: Normocephalic and atraumatic.      Mouth/Throat:      Mouth: Mucous membranes are dry.      Comments: Dried red blood surrounding lips  Cardiovascular:      Rate and Rhythm: Normal rate and regular rhythm.      Pulses: Normal pulses.      Heart sounds: Normal heart sounds.   Pulmonary:      Effort: Pulmonary effort is normal.      Comments: Decreased air movement bilaterally  Chest:      Chest wall: No tenderness.   Abdominal:      General: Abdomen is flat. Bowel sounds are normal.   Skin:     General: Skin is warm and dry.   Neurological:      Mental Status: He is alert. He is disoriented.       Last Recorded Vitals  Blood pressure 98/57, pulse 90, temperature 37 °C (98.6 °F), resp. rate 18, height 1.676 m (5' 5.98\"), weight 56.7 kg (125 lb), SpO2 93 %.  Intake/Output last 3 Shifts:  I/O last 3 completed shifts:  In: 327 (5.8 mL/kg) [Blood:277; IV Piggyback:50]  Out: 450 (7.9 mL/kg) [Urine:450 (0.2 mL/kg/hr)]  Weight: 56.7 kg     Relevant Results         "           Assessment/Plan   Principal Problem:    Pancreatic mass    Asad Vines is an 78-year-old with past medical history COPD, hx VTE on Xarelto, CAD, pancreatic mass, hypertension, hyperlipidemia, peripheral vascular disease status post right AKA (2020), bladder cancer history in remission (1979), tobacco use disorder who initially presented to [facility] on 11/6/2023 with generalized weakness and shortness of breath, treated for sepsis due to MRSA bacteremia and UTI, QI requiring dialysis, acute hypoxic respiratory failure requiring prior intubation, HAP. Now transferred to [facility] on 11/23 for further GI evaluation of pancreatic mass. Planning to consult GI for tissue diagnosis of suspected pancreatic cancer and supportive oncology.  Also have ID on board for MRSA bacteremia.  Will consult renal for oliguric QI.     Updates 11/24/23:  -Had in-depth conversation with son regarding father's poor prognosis.  Will rediscuss CODE STATUS and GoC after supportive oncology conversation  -Consulted nephrology, infectious disease, respiratory therapy and supportive oncology.  Will follow-up recommendations  -INR this morning 4.2 (was 4.1 yesterday).  INR refractory to transfusion of FFP  -Patient not a good candidate for EUS with biopsy given concerning respiratory status, will reengage if patient's respiratory status improves and family is still interested  -Patient failed SLP eval today    #Pancreatic mass  #Hyperbilirubinemia 2/2 extrinsic compression   -concerning for malignancy  -CA 19-9: 5,157.47  -GI consult tomorrow for evaluation for possible biopsy via EUS  -Discussed GOC with family. They would like to pursue a diagnosis and understand it is likely cancer. In interim, would like father to be full code  -Supportive oncology consulted, will speak to son 11/24  -Will re discuss GoC after     #Coagulopathy   -INR reported 4 at OSH. Previously received vitamin K and FFP at OSH   -INR 4.2, PT 47.4, aPTT  172  -Refractory to transfusion of 1 unit of FFP     #MRSA bacteremia  #MRSA and E faecalis UTI   -Family reports traumatic oleary placement during his admission in October which could be a source for his MRSA  -TTE and HELEN at [facility] without evidence of vegetation. Tagged WBC scan without occult source of infection   -Blood cultures clear since 11/9  -continue daptomycin for treatment of MRSA. Had been receiving after his HD sessions. After discussion with pharmacy will hold off on ordering until his upcoming HD sessions are established   Prior abx course:   Vancomycin (11/7-11/16) stopped due to kidney injury   Daptomycin (11/19 - ); per OSH MAR last dose was given 11/21  Meropenem (11/19- 11/22)   Ceftriaxone (11/22- )  -ID consulted for antibiotic recommendations, will follow-up recs     #HAP  #Acute hypoxic resp failure  #c/f aspiration   -Tracheal aspirate with Klebsiella   -continue with planned 7 day course of abx to end 11/25. Previously on meropenem, will narrow to CTX based on sensitivities   -continue O2 to maintain sats > 88%   -swallow eval given high aspiration risk   -Was on prednisone 40mg at OSH for ?COPD exacerbation? Will hold off on steroids and re-assess     #oliguric QI requiring dialysis via temporary R HD line   -Family reports no hx of CKD. Reportedly developed QI during hospital stay thought due to AIN from vanc vs ATN   -Pt underwent first dialysis 11/21. A second session performed 11/22.   -Repeat labs here   -Maintain oleary, strict I/Os, avoid nephro toxins  -Renal consulted, will follow up recs     #COPD  -continue home inhalers      #Hx VTE  -previously on xarelto at home   -Given potential need for biopsy, will hold on therapeutic anticoagulation      F: PRN  E: PRN  N: NPO pending speech eval  Access: R temp HD line, oleary, PIV   Oxygen: room air  Drips: none  DVT ppx: SubQ heparin  GI ppx: pantoprazole  Antibiotics: Ceftriaxone (until 11/25)  Code Status: Full, confirmed with son  Anselmo DUFFY: Anselmo Vines (Son): [phone], Christy (daughter): [phone]  Dispo: PT/OT jonatan Gomez MD

## 2023-11-24 NOTE — HOSPITAL COURSE
Asad Vines is a 78 y.o. male presenting with past medical history COPD, hx VTE on Xarelto, CAD, pancreatic mass, hypertension, hyperlipidemia, peripheral vascular disease status post right AKA (2020), bladder cancer history in remission (1979), tobacco use disorder who initially presented to [facility] on 11/6/2023 with generalized weakness and shortness of breath, treated for sepsis, kidney failure, respiratory failure. Now transferred to [facility] on 11/23 for further GI evaluation of pancreatic mass.     Patient had long and prolonged course at the outside hospital including sepsis with MRSA bacteremia and MRSA UTI of unknown source.  Infectious disease was consulted [facility].  HELEN on 11/10/2023 showed no significant evidence of vegetations on the valves, no endocarditis, mild MR, no evidence of thrombus in the atrial cavity or appendage, no defect or PFO identified.  TTE on 11/8/2023 showed concentric left VH, likely normal systolic function, regional wall motion abnormalities cannot be excluded.  Patient received tagged WBC study which did not find a source of infection.  On 11/14 patient was noted to be jaundiced with hyperbilirubinemia and elevated LFTs.  GI was consulted with plan for ERCP however this was later deferred due to coagulopathy with INR elevated to 4.0 even despite FFP and vitamin K.  During his hospital stay, patient developed QI and eventual renal failure thought due to AIN due to vancomycin vs ATN due to shock. Nephrology eventually start the patient on dialysis with first session 11/21.  On 11/18, rapid response was called for hypoxia thought due to aspiration.  Patient eventually required transfer to the MICU on high flow nasal cannula and was later intubated after altered mental status.  Prior to intubation, patient was noted to be hypotensive with SBP in 90s, for which norepinephrine was started on.  Patient was extubated eventually to 5L NC. Transferred out of ICU on  11/23.    Upon admission to [facility], patient was continued on abx for prior infections. Nephrology was consulted for recommendations regarding hemodialysis in the setting of QI with renal failure. Pt was witnessed to have continued aspiration of oral secretions and very weak cough. Labs notable for uptrending bilirubin due to mass effect on the biliary system. CA 19-9 highly elevated >5000 which supported diagnosis of presumed pancreas cancer. GOC were re-discussed with patient and family and ultimately family elected to focus on patient's priority of being at home and eating for comfort despite aspiration risk. Family no longer wanted to pursue biopsy of mass and elected to pursue hospice care at home. Patient was discharged on 11/26 with home hospice arranged.

## 2023-11-24 NOTE — PROGRESS NOTES
Speech-Language Pathology        Inpatient Speech-Language Pathology Clinical Swallow Evaluation    Patient Name: Asda Vines  MRN: [id number]  Today's Date: 11/24/2023   Time Calculation  Start Time: 0900  Stop Time: 0910  Time Calculation (min): 10 min       Recommendations:  NPO with short-term non-oral means of nutrition  Frequent/aggressive oral care  May consider ENT consult for oropharyngeal trauma    Assessment:   Clinical bedside swallow evaluation limited as pt not clinically appropriate/safe for PO trials given compromised respiratory status (O2 sats 77%) in addition to multiple other risk factors for dysphagia including poor oral hygiene, AMS, and ?reduced secretion management. Strongly recommend NPO d/t high aspiration risk. SLP re-evaluation given improved/stable respiratory status and management of secretions.     Plan:  Inpatient/Swing Bed or Outpatient: Inpatient  SLP Plan: Skilled SLP  SLP Frequency: 1x per week  Duration: 2 weeks  SLP Discharge Recommendations: Continue skilled speech therapy services post discharge  Discussed Risks/Benefits: Yes, Patient, Nursing, Physician  Patient/Caregiver Agreeable: Yes  SLP - OK to Discharge: Yes        Subjective     History and Physical:    Asad Vines is an 78-year-old with past medical history COPD, hx VTE on Xarelto, CAD, pancreatic mass, hypertension, hyperlipidemia, peripheral vascular disease status post right AKA (2020), bladder cancer history in remission (1979), tobacco use disorder who initially presented to [facility] on 11/6/2023 with generalized weakness and shortness of breath, treated for sepsis due to MRSA bacteremia and UTI, QI requiring dialysis, acute hypoxic respiratory failure requiring prior intubation, HAP. Now transferred to [facility] on 11/23 for further GI evaluation of pancreatic mass. Planning to consult GI for tissue diagnosis of suspected pancreatic cancer and supportive oncology.  Also have ID on board for MRSA  "bacteremia.  Will consult renal for oliguric QI.     Chest XR   Impression: Unchanged left basilar pneumonia.  Minimal atelectasis at right lung base    Relevant SLP Hx:  None available to SLP- unclear diet at OSH.     Baseline Assessment:  Respiratory Status: Oxygen via nasal cannula, RN arrived after SLP cleared to see pt per RN;vitals assessed and pt O2 saturations at 77%.   History of Intubation: Yes, at OSH; unclear duration   Behavior/Cognition: Alert, Cooperative, Confused,Oriented x1.   Patient Positioning: Upright in Bed      Objective     Oral/Motor Assessment:  Oral Hygiene: Poor; dentition intact however pt with copious amount of dried blood on posterior 2/3 of the palate, in oropharynx,, and across lingual surface, sloughing off  Oral Motor: Impaired Function  Facial Sensation:  When asked regarding numbness/tinglying pt stated \" Maybe a tad tingly\"  Facial Symmetry: Within Functional Limits  Lingual ROM:  (Reduced protrusion)  Vocal Quality: Exceptions to WFL  Vocal Quality Impairment:  \"hot potato\" voice  Intelligibility: Intelligibility reduced  Intelligibility Ratin%-50%      Clinical Observations:  Consistencies Trialed: Unable to assess  Trials deferred 2/2 high risk for aspiration given breathing at 77%; RN called RT and turned up supplemental O2.    Inpatient Education:  Pt educated on result and recommendations. Pt did not indicated understanding and will need further education.        Goals: Patient/Family will verbalize/demonstrate comprehension of dysphagia education, strategies, recommendations and POC.      "

## 2023-11-25 ENCOUNTER — APPOINTMENT (OUTPATIENT)
Dept: DIALYSIS | Facility: HOSPITAL | Age: 78
End: 2023-11-25
Payer: MEDICARE

## 2023-11-25 LAB
ALBUMIN SERPL BCP-MCNC: 3 G/DL (ref 3.4–5)
ALP SERPL-CCNC: 394 U/L (ref 33–136)
ALT SERPL W P-5'-P-CCNC: 119 U/L (ref 10–52)
ANION GAP SERPL CALC-SCNC: 25 MMOL/L (ref 10–20)
APTT PPP: 188 SECONDS (ref 27–38)
AST SERPL W P-5'-P-CCNC: 101 U/L (ref 9–39)
BASOPHILS # BLD AUTO: 0.01 X10*3/UL (ref 0–0.1)
BASOPHILS NFR BLD AUTO: 0.1 %
BILIRUB DIRECT SERPL-MCNC: 6.2 MG/DL (ref 0–0.3)
BILIRUB SERPL-MCNC: 9 MG/DL (ref 0–1.2)
BLOOD EXPIRATION DATE: NORMAL
BUN SERPL-MCNC: 76 MG/DL (ref 6–23)
CALCIUM SERPL-MCNC: 8.8 MG/DL (ref 8.6–10.6)
CHLORIDE SERPL-SCNC: 100 MMOL/L (ref 98–107)
CO2 SERPL-SCNC: 22 MMOL/L (ref 21–32)
CREAT SERPL-MCNC: 6.92 MG/DL (ref 0.5–1.3)
DISPENSE STATUS: NORMAL
EOSINOPHIL # BLD AUTO: 0.08 X10*3/UL (ref 0–0.4)
EOSINOPHIL NFR BLD AUTO: 1.1 %
ERYTHROCYTE [DISTWIDTH] IN BLOOD BY AUTOMATED COUNT: 18.7 % (ref 11.5–14.5)
GFR SERPL CREATININE-BSD FRML MDRD: 7 ML/MIN/1.73M*2
GLUCOSE BLD MANUAL STRIP-MCNC: 140 MG/DL (ref 74–99)
GLUCOSE BLD MANUAL STRIP-MCNC: 141 MG/DL (ref 74–99)
GLUCOSE BLD MANUAL STRIP-MCNC: 142 MG/DL (ref 74–99)
GLUCOSE BLD MANUAL STRIP-MCNC: 156 MG/DL (ref 74–99)
GLUCOSE SERPL-MCNC: 141 MG/DL (ref 74–99)
HBV SURFACE AB SER-ACNC: <3.1 MIU/ML
HBV SURFACE AG SERPL QL IA: NONREACTIVE
HCT VFR BLD AUTO: 31.2 % (ref 41–52)
HGB BLD-MCNC: 10.1 G/DL (ref 13.5–17.5)
IMM GRANULOCYTES # BLD AUTO: 0.05 X10*3/UL (ref 0–0.5)
IMM GRANULOCYTES NFR BLD AUTO: 0.7 % (ref 0–0.9)
INR PPP: 4.3 (ref 0.9–1.1)
LYMPHOCYTES # BLD AUTO: 0.37 X10*3/UL (ref 0.8–3)
LYMPHOCYTES NFR BLD AUTO: 5.2 %
MAGNESIUM SERPL-MCNC: 2.51 MG/DL (ref 1.6–2.4)
MCH RBC QN AUTO: 28.7 PG (ref 26–34)
MCHC RBC AUTO-ENTMCNC: 32.4 G/DL (ref 32–36)
MCV RBC AUTO: 89 FL (ref 80–100)
MONOCYTES # BLD AUTO: 0.52 X10*3/UL (ref 0.05–0.8)
MONOCYTES NFR BLD AUTO: 7.3 %
NEUTROPHILS # BLD AUTO: 6.12 X10*3/UL (ref 1.6–5.5)
NEUTROPHILS NFR BLD AUTO: 85.6 %
NRBC BLD-RTO: 0 /100 WBCS (ref 0–0)
PLATELET # BLD AUTO: 227 X10*3/UL (ref 150–450)
POTASSIUM SERPL-SCNC: 5.2 MMOL/L (ref 3.5–5.3)
PRODUCT BLOOD TYPE: 6200
PRODUCT CODE: NORMAL
PROT SERPL-MCNC: 7.7 G/DL (ref 6.4–8.2)
PROTHROMBIN TIME: 49.3 SECONDS (ref 9.8–12.8)
RBC # BLD AUTO: 3.52 X10*6/UL (ref 4.5–5.9)
SODIUM SERPL-SCNC: 142 MMOL/L (ref 136–145)
UNIT ABO: NORMAL
UNIT NUMBER: NORMAL
UNIT RH: NORMAL
UNIT VOLUME: 277
WBC # BLD AUTO: 7.2 X10*3/UL (ref 4.4–11.3)

## 2023-11-25 PROCEDURE — 82947 ASSAY GLUCOSE BLOOD QUANT: CPT

## 2023-11-25 PROCEDURE — 2500000004 HC RX 250 GENERAL PHARMACY W/ HCPCS (ALT 636 FOR OP/ED)

## 2023-11-25 PROCEDURE — 85025 COMPLETE CBC W/AUTO DIFF WBC: CPT

## 2023-11-25 PROCEDURE — C9113 INJ PANTOPRAZOLE SODIUM, VIA: HCPCS

## 2023-11-25 PROCEDURE — 85610 PROTHROMBIN TIME: CPT

## 2023-11-25 PROCEDURE — 1210000001 HC SEMI-PRIVATE ROOM DAILY

## 2023-11-25 PROCEDURE — 96372 THER/PROPH/DIAG INJ SC/IM: CPT

## 2023-11-25 PROCEDURE — 5A1D70Z PERFORMANCE OF URINARY FILTRATION, INTERMITTENT, LESS THAN 6 HOURS PER DAY: ICD-10-PCS | Performed by: STUDENT IN AN ORGANIZED HEALTH CARE EDUCATION/TRAINING PROGRAM

## 2023-11-25 PROCEDURE — 2500000004 HC RX 250 GENERAL PHARMACY W/ HCPCS (ALT 636 FOR OP/ED): Performed by: STUDENT IN AN ORGANIZED HEALTH CARE EDUCATION/TRAINING PROGRAM

## 2023-11-25 PROCEDURE — 2500000004 HC RX 250 GENERAL PHARMACY W/ HCPCS (ALT 636 FOR OP/ED): Mod: JZ | Performed by: INTERNAL MEDICINE

## 2023-11-25 PROCEDURE — 36415 COLL VENOUS BLD VENIPUNCTURE: CPT

## 2023-11-25 PROCEDURE — 36415 COLL VENOUS BLD VENIPUNCTURE: CPT | Performed by: INTERNAL MEDICINE

## 2023-11-25 PROCEDURE — 2500000004 HC RX 250 GENERAL PHARMACY W/ HCPCS (ALT 636 FOR OP/ED): Mod: JZ

## 2023-11-25 PROCEDURE — 86706 HEP B SURFACE ANTIBODY: CPT | Performed by: INTERNAL MEDICINE

## 2023-11-25 PROCEDURE — 80053 COMPREHEN METABOLIC PANEL: CPT

## 2023-11-25 PROCEDURE — 2500000002 HC RX 250 W HCPCS SELF ADMINISTERED DRUGS (ALT 637 FOR MEDICARE OP, ALT 636 FOR OP/ED): Performed by: STUDENT IN AN ORGANIZED HEALTH CARE EDUCATION/TRAINING PROGRAM

## 2023-11-25 PROCEDURE — 94640 AIRWAY INHALATION TREATMENT: CPT

## 2023-11-25 PROCEDURE — 8010000001 HC DIALYSIS - HEMODIALYSIS PER DAY

## 2023-11-25 PROCEDURE — 83735 ASSAY OF MAGNESIUM: CPT

## 2023-11-25 PROCEDURE — 99233 SBSQ HOSP IP/OBS HIGH 50: CPT | Performed by: INTERNAL MEDICINE

## 2023-11-25 PROCEDURE — 82248 BILIRUBIN DIRECT: CPT | Performed by: STUDENT IN AN ORGANIZED HEALTH CARE EDUCATION/TRAINING PROGRAM

## 2023-11-25 PROCEDURE — 99232 SBSQ HOSP IP/OBS MODERATE 35: CPT | Performed by: INTERNAL MEDICINE

## 2023-11-25 PROCEDURE — 87340 HEPATITIS B SURFACE AG IA: CPT | Performed by: INTERNAL MEDICINE

## 2023-11-25 RX ORDER — POLYETHYLENE GLYCOL 3350 17 G/17G
17 POWDER, FOR SOLUTION ORAL DAILY
Start: 2023-11-26

## 2023-11-25 RX ORDER — IPRATROPIUM BROMIDE AND ALBUTEROL SULFATE 2.5; .5 MG/3ML; MG/3ML
3 SOLUTION RESPIRATORY (INHALATION)
Qty: 270 ML | Refills: 0
Start: 2023-11-25 | End: 2023-12-25

## 2023-11-25 RX ORDER — SERTRALINE HYDROCHLORIDE 100 MG/1
100 TABLET, FILM COATED ORAL NIGHTLY
Start: 2023-11-25

## 2023-11-25 RX ADMIN — PANTOPRAZOLE SODIUM 40 MG: 40 INJECTION, POWDER, FOR SOLUTION INTRAVENOUS at 08:37

## 2023-11-25 RX ADMIN — ALTEPLASE 2 MG: 2.2 INJECTION, POWDER, LYOPHILIZED, FOR SOLUTION INTRAVENOUS at 17:52

## 2023-11-25 RX ADMIN — ALTEPLASE 2 MG: 2.2 INJECTION, POWDER, LYOPHILIZED, FOR SOLUTION INTRAVENOUS at 17:51

## 2023-11-25 RX ADMIN — IPRATROPIUM BROMIDE AND ALBUTEROL SULFATE 3 ML: .5; 3 SOLUTION RESPIRATORY (INHALATION) at 19:15

## 2023-11-25 RX ADMIN — CEFTRIAXONE SODIUM 2 G: 2 INJECTION, SOLUTION INTRAVENOUS at 18:37

## 2023-11-25 RX ADMIN — HEPARIN SODIUM 5000 UNITS: 5000 INJECTION INTRAVENOUS; SUBCUTANEOUS at 02:07

## 2023-11-25 RX ADMIN — ALTEPLASE: 2.2 INJECTION, POWDER, LYOPHILIZED, FOR SOLUTION INTRAVENOUS at 13:45

## 2023-11-25 RX ADMIN — HEPARIN SODIUM 5000 UNITS: 5000 INJECTION INTRAVENOUS; SUBCUTANEOUS at 10:52

## 2023-11-25 RX ADMIN — PHYTONADIONE 10 MG: 10 INJECTION, EMULSION INTRAMUSCULAR; INTRAVENOUS; SUBCUTANEOUS at 08:37

## 2023-11-25 ASSESSMENT — COGNITIVE AND FUNCTIONAL STATUS - GENERAL
DAILY ACTIVITIY SCORE: 9
EATING MEALS: A LOT
EATING MEALS: A LOT
DRESSING REGULAR LOWER BODY CLOTHING: A LOT
MOVING FROM LYING ON BACK TO SITTING ON SIDE OF FLAT BED WITH BEDRAILS: A LOT
STANDING UP FROM CHAIR USING ARMS: TOTAL
TURNING FROM BACK TO SIDE WHILE IN FLAT BAD: A LOT
TURNING FROM BACK TO SIDE WHILE IN FLAT BAD: A LOT
WALKING IN HOSPITAL ROOM: TOTAL
PERSONAL GROOMING: TOTAL
TOILETING: TOTAL
WALKING IN HOSPITAL ROOM: TOTAL
CLIMB 3 TO 5 STEPS WITH RAILING: TOTAL
MOBILITY SCORE: 8
HELP NEEDED FOR BATHING: TOTAL
MOVING TO AND FROM BED TO CHAIR: TOTAL
MOVING FROM LYING ON BACK TO SITTING ON SIDE OF FLAT BED WITH BEDRAILS: A LOT
DAILY ACTIVITIY SCORE: 10
MOVING TO AND FROM BED TO CHAIR: TOTAL
HELP NEEDED FOR BATHING: TOTAL
MOBILITY SCORE: 8
PERSONAL GROOMING: A LOT
CLIMB 3 TO 5 STEPS WITH RAILING: TOTAL
STANDING UP FROM CHAIR USING ARMS: TOTAL
DRESSING REGULAR LOWER BODY CLOTHING: A LOT
DRESSING REGULAR UPPER BODY CLOTHING: A LOT
TOILETING: TOTAL
DRESSING REGULAR UPPER BODY CLOTHING: A LOT

## 2023-11-25 ASSESSMENT — PAIN SCALES - PAIN ASSESSMENT IN ADVANCED DEMENTIA (PAINAD)
BODYLANGUAGE: RELAXED
CONSOLABILITY: NO NEED TO CONSOLE
FACIALEXPRESSION: SMILING OR INEXPRESSIVE
BREATHING: NORMAL
TOTALSCORE: 0

## 2023-11-25 ASSESSMENT — PAIN - FUNCTIONAL ASSESSMENT
PAIN_FUNCTIONAL_ASSESSMENT: 0-10
PAIN_FUNCTIONAL_ASSESSMENT: 0-10

## 2023-11-25 ASSESSMENT — PAIN SCALES - GENERAL
PAINLEVEL_OUTOF10: 0 - NO PAIN

## 2023-11-25 ASSESSMENT — PAIN SCALES - WONG BAKER: WONGBAKER_NUMERICALRESPONSE: NO HURT

## 2023-11-25 NOTE — CARE PLAN
Problem: Psychosocial Needs  Goal: Demonstrates ability to cope with hospitalization/illness  Outcome: Not Progressing  Goal: Collaborate with me, my family, and caregiver to identify my specific goals  Outcome: Not Progressing     Problem: Chronic Conditions and Co-morbidities  Goal: Patient's chronic conditions and co-morbidity symptoms are monitored and maintained or improved  Outcome: Not Progressing     Problem: Nutrition  Goal: Oral intake greater than 50%  Outcome: Not Progressing  Goal: Consume prescribed supplement  Outcome: Not Progressing  Goal: Adequate PO fluid intake  Outcome: Not Progressing  Goal: Nutrition support goals are met within 48 hrs  Outcome: Not Progressing  Goal: Nutrition support is meeting 75% of nutrient needs  Outcome: Not Progressing  Goal: Tube feed tolerance  Outcome: Not Progressing  Goal: BG  mg/dL  Outcome: Not Progressing  Goal: Lab values WNL  Outcome: Not Progressing  Goal: Electrolytes WNL  Outcome: Not Progressing  Goal: Promote healing  Outcome: Not Progressing  Goal: Maintain stable weight  Outcome: Not Progressing  Goal: Reduce weight from edema/fluid  Outcome: Not Progressing  Goal: Gradual weight gain  Outcome: Not Progressing  Goal: Improve ostomy output  Outcome: Not Progressing   The patient's goals for the shift include      The clinical goals for the shift include pt SpO2 remain greater than 88%      Problem: Skin  Goal: Prevent/minimize sheer/friction injuries  11/25/2023 1632 by Candelaria Mckeon RN  Outcome: Progressing  Flowsheets (Taken 11/25/2023 1632)  Prevent/minimize sheer/friction injuries:   HOB 30 degrees or less   Use pull sheet   Utilize specialty bed per algorithm   Turn/reposition every 2 hours/use positioning/transfer devices  11/25/2023 1629 by Candelaria Mckeon RN  Flowsheets (Taken 11/23/2023 1335 by Lindy Ma RN)  Prevent/minimize sheer/friction injuries:   Complete micro-shifts as needed if patient unable. Adjust  patient position to relieve pressure points, not a full turn   HOB 30 degrees or less   Increase activity/out of bed for meals   Turn/reposition every 2 hours/use positioning/transfer devices   Utilize specialty bed per algorithm   Use pull sheet  11/25/2023 1627 by Candelaria Mckeon RN  Outcome: Not Progressing  Goal: Promote/optimize nutrition  11/25/2023 1629 by Candelaria Mckeon RN  Outcome: Progressing  11/25/2023 1627 by Candelaria Mckeon RN  Outcome: Not Progressing  Goal: Promote skin healing  11/25/2023 1632 by Candelaria Mckeon RN  Outcome: Progressing  Flowsheets (Taken 11/25/2023 1632)  Promote skin healing:   Turn/reposition every 2 hours/use positioning/transfer devices   Assess skin/pad under line(s)/device(s)   Ensure correct size (line/device) and apply per  instructions  11/25/2023 1629 by Candelaria Mckeon RN  Flowsheets (Taken 11/23/2023 1335 by Lindy Ma RN)  Promote skin healing:   Assess skin/pad under line(s)/device(s)   Ensure correct size (line/device) and apply per  instructions   Protective dressings over bony prominences   Rotate device position/do not position patient on device   Turn/reposition every 2 hours/use positioning/transfer devices  11/25/2023 1627 by Candelaria Mckeon RN  Outcome: Not Progressing     Problem: Psychosocial Needs  Goal: Demonstrates ability to cope with hospitalization/illness  Outcome: Not Progressing  Goal: Collaborate with me, my family, and caregiver to identify my specific goals  Outcome: Not Progressing     Problem: Chronic Conditions and Co-morbidities  Goal: Patient's chronic conditions and co-morbidity symptoms are monitored and maintained or improved  Outcome: Not Progressing     Problem: Nutrition  Goal: Oral intake greater than 50%  Outcome: Not Progressing  Goal: Consume prescribed supplement  Outcome: Not Progressing  Goal: Adequate PO fluid intake  Outcome: Not Progressing  Goal:  Nutrition support goals are met within 48 hrs  Outcome: Not Progressing  Goal: Nutrition support is meeting 75% of nutrient needs  Outcome: Not Progressing  Goal: Tube feed tolerance  Outcome: Not Progressing  Goal: BG  mg/dL  Outcome: Not Progressing  Goal: Lab values WNL  Outcome: Not Progressing  Goal: Electrolytes WNL  Outcome: Not Progressing  Goal: Promote healing  Outcome: Not Progressing  Goal: Maintain stable weight  Outcome: Not Progressing  Goal: Reduce weight from edema/fluid  Outcome: Not Progressing  Goal: Gradual weight gain  Outcome: Not Progressing  Goal: Improve ostomy output  Outcome: Not Progressing

## 2023-11-25 NOTE — CARE PLAN
The patient's goals for the shift include      The clinical goals for the shift include pt SpO2 remain greater than 88%    Problem: Skin  Goal: Prevent/minimize sheer/friction injuries  Outcome: Not Progressing  Goal: Promote/optimize nutrition  Outcome: Not Progressing  Goal: Promote skin healing  Outcome: Not Progressing     Problem: Psychosocial Needs  Goal: Demonstrates ability to cope with hospitalization/illness  Outcome: Not Progressing  Goal: Collaborate with me, my family, and caregiver to identify my specific goals  Outcome: Not Progressing     Problem: Chronic Conditions and Co-morbidities  Goal: Patient's chronic conditions and co-morbidity symptoms are monitored and maintained or improved  Outcome: Not Progressing     Problem: Nutrition  Goal: Oral intake greater than 50%  Outcome: Not Progressing  Goal: Consume prescribed supplement  Outcome: Not Progressing  Goal: Adequate PO fluid intake  Outcome: Not Progressing  Goal: Nutrition support goals are met within 48 hrs  Outcome: Not Progressing  Goal: Nutrition support is meeting 75% of nutrient needs  Outcome: Not Progressing  Goal: Tube feed tolerance  Outcome: Not Progressing  Goal: BG  mg/dL  Outcome: Not Progressing  Goal: Lab values WNL  Outcome: Not Progressing  Goal: Electrolytes WNL  Outcome: Not Progressing  Goal: Promote healing  Outcome: Not Progressing  Goal: Maintain stable weight  Outcome: Not Progressing  Goal: Reduce weight from edema/fluid  Outcome: Not Progressing  Goal: Gradual weight gain  Outcome: Not Progressing  Goal: Improve ostomy output  Outcome: Not Progressing

## 2023-11-25 NOTE — PROGRESS NOTES
11/25/23 1446   Discharge Planning   Living Arrangements Children   Support Systems Children;Family members   Assistance Needed Home with hospice care   Type of Residence Private residence   Home or Post Acute Services In home services   Type of Home Care Services Hospice   Patient expects to be discharged to: Home   Does the patient need discharge transport arranged? Yes   RoundTrip coordination needed? Yes      Received message from primary team that GOC was held and son has decided to move forward with hospice services. Chart reviewed and noted that patient was a transfer from [facility] and resides in [place], [place].    Called and spoke with son Anselmo ([phone]); he confirmed that he has decided to proceed with hospice care for his father. Son was unfamiliar with hospice agencies and agreeable for referral to [facility]. Son states that patient lives with him and he does have a hospital bed and other medical equipment already at home. Discussed that plan would be for patient to discharge home tomorrow morning and [facility] would come to the home to start hospice care. Son is agreeable to this.     Call placed to [facility] ([phone])- spoke with on call RN who requested that referral information be faxed to them at [phone]. Discussed plan for discharge tomorrow morning and nurse states that she will review with supervisor to make sure that someone can go to the home tomorrow afternoon to start services.     Referral information was faxed to [facility].     Per RN, patient is currently on 6L of 02; will request stretcher transportation via Roundtrip for 10:30am.     UPDATE 1510 Transportation scheduled for 10am tomorrow via [facility].     Called and spoke with gwen Briones regarding above; he is aware and agreeable. He confirmed that he does have large oxygen concentrator at home for patient. Discussed that patient is currently on 6L. He is aware to expect a phone call from [facility] to arrange a time for them to go out to the home.    Call placed to [facility] and spoke with on call nurse Ioana; provided her with update regarding  time tomorrow.     -Kandis PEREIRA MA, LSW  [phone] or [facility]  Care Transitions

## 2023-11-25 NOTE — PROGRESS NOTES
"Asad Vines is a 78 y.o. male on day 2 of admission presenting with Pancreatic mass.    Subjective   Interval History: Patient wants to go home.  Patient is on 6 L nasal cannula oxygen.    Review of Systems    Objective   Range of Vitals (last 24 hours)  Heart Rate:  [82-93]   Temp:  [36 °C (96.8 °F)-37 °C (98.6 °F)]   Resp:  [17]   BP: ()/(57-86)   Height:  [167.6 cm (5' 5.98\")]   Weight:  [56.7 kg (125 lb)]   SpO2:  [90 %-98 %]   Daily Weight  11/24/23 : 56.7 kg (125 lb)    Body mass index is 20.19 kg/m².    Physical Exam  GENERAL APPEARANCE: Awake and alert and on nasal cannula oxygen  HEENT: Atraumatic and normocephalic  CARDIAC: Regular   LUNGS: Upper airway sounds noted  ABDOMEN: Soft and nontender  EXTREMITIES: Right AKA noted  SKIN: Right IJ HD line noted.  Jaundiced skin noted.      Antibiotics  cefTRIAXone (Rocephin) 2 g IV in dextrose 5% 50 mL  dextrose 50 % injection 25 g  glucagon (Glucagen) injection 1 mg  dextrose 10 % in water (D10W) infusion  insulin lispro (HumaLOG) injection 0-5 Units  insulin glargine (Lantus) injection 10 Units  insulin lispro (HumaLOG) injection 0-5 Units  heparin (porcine) injection 5,000 Units  sertraline (Zoloft) tablet 100 mg  melatonin tablet 3 mg  polyethylene glycol (Glycolax, Miralax) packet 17 g  tamsulosin (Flomax) 24 hr capsule 0.4 mg  pantoprazole (ProtoNix) EC tablet 40 mg  ipratropium-albuteroL (Duo-Neb) 0.5-2.5 mg/3 mL nebulizer solution 3 mL  pantoprazole (ProtoNix) injection 40 mg  oxygen (O2) therapy  ipratropium-albuteroL (Duo-Neb) 0.5-2.5 mg/3 mL nebulizer solution 3 mL  phytonadione (Vitamin K) 10 mg in dextrose 5 % in water (D5W) 50 mL IV  furosemide (Lasix) injection 40 mg      Relevant Results  Labs  Results from last 72 hours   Lab Units 11/25/23  0617 11/24/23  0622 11/23/23  1716   WBC AUTO x10*3/uL 7.2 8.2 8.1   HEMOGLOBIN g/dL 10.1* 9.5* 10.2*   HEMATOCRIT % 31.2* 29.0* 31.5*   PLATELETS AUTO x10*3/uL 227 234 252   NEUTROS PCT AUTO % 85.6 " "85.2 84.0   LYMPHS PCT AUTO % 5.2 5.9 7.4   MONOS PCT AUTO % 7.3 7.2 7.4   EOS PCT AUTO % 1.1 1.1 0.7     Results from last 72 hours   Lab Units 11/25/23  0617 11/24/23  0622 11/23/23  1716   SODIUM mmol/L 142 142 139   POTASSIUM mmol/L 5.2 5.1 4.7   CHLORIDE mmol/L 100 101 100   CO2 mmol/L 22 22 26   BUN mg/dL 76* 63* 58*   CREATININE mg/dL 6.92* 5.75* 4.99*   GLUCOSE mg/dL 141* 139* 157*   CALCIUM mg/dL 8.8 8.5* 8.8   ANION GAP mmol/L 25* 24* 18   EGFR mL/min/1.73m*2 7* 9* 11*     Results from last 72 hours   Lab Units 11/25/23  0617 11/24/23  0622 11/23/23  1716   ALK PHOS U/L 394* 400* 415*   BILIRUBIN TOTAL mg/dL 9.0* 7.7* 6.7*   BILIRUBIN DIRECT mg/dL 6.2*  --  4.8*   PROTEIN TOTAL g/dL 7.7 7.1 7.5   ALT U/L 119* 125* 134*   AST U/L 101* 109* 113*   ALBUMIN g/dL 3.0* 3.1* 3.2*     Estimated Creatinine Clearance: 6.8 mL/min (A) (by C-G formula based on SCr of 6.92 mg/dL (H)).  No results found for: \"CRP\"  Microbiology    Imaging              Assessment/Plan   77yo male with hx of COPD, CAD, PAD was hospitalized at outside hospital from 11/6 to 11/24 for septic shock with MRSA bacteremia,  ventilator associated pneumonia , UTI and QI,    he was transferred to us for evaluation on his pancreas mass.    Urine culture grew MRSA and Enterococcus faecalis.   Respiratory culture from tracheal aspirate grew Klebsiella oxytoca.  Was on vancomycin for MRSA bacteremia and ceftriaxone for klebsiella pneumonia( until 11/25)  .  His vancomycin was switched to daptomycin due to kidney failure.  He is on dialysis now.  It is not clear why they planned to keep her on iv antibiotic for 6 weeks ( end date 12/21)  in absence of endocarditis or osteomyelitis.     Was also on prednisone for COPD flare.       Plan  I have reviewed outside records.  Initially diagnosed with MRSA bacteremia and started on vancomycin but later changed to daptomycin for QI. Patient underwent appropriate work-up at outside hospital for MRSA bacteremia " including HELEN and WBC tagged scan which were negative.  In my view patient has received adequate duration of antibiotic therapy (about 20 days) and so vancomycin/daptomycin can be discontinued.  Please discontinue ceftriaxone also after last dose today.  Please take aspiration precautions and bronchopulmonary hygiene.  ID will sign off at this time but please feel free to call with any questions or concerns      Pradeep Chase MD

## 2023-11-25 NOTE — NURSING NOTE
Report from Sending RN:    Report From: ***  Recent Surgery of Procedure: {YES /NO:50234}  Baseline Level of Consciousness (LOC): ***  Oxygen Use: {YES /NO:27466}  Type: ***  Diabetic: {YES /NO:33151}  Last BP Med Given Day of Dialysis: ***  Last Pain Med Given: ***  Lab Tests to be Obtained with Dialysis: {YES /NO:03691}  Blood Transfusion to be Given During Dialysis: {YES /NO:69194}  Available IV Access: {YES /NO:54750}  Medications to be Administered During Dialysis: {YES /NO:02706}  Continuous IV Infusion Running: {YES /NO:45460}  Restraints on Currently or in the Last 24 Hours: {YES /NO:96641}  Hand-Off Communication: ***

## 2023-11-25 NOTE — NURSING NOTE
Report to Receiving RN:    Report To: Maame  Time Report Called: 1800  Hand-Off Communication: Spoke most of treatment, very confused. BP good 126/78 and we removed 1.5L  Complications During Treatment: Yes, Had to stop tx and do a Cath Leo dwell. Catheter ran but it was reversed. Locked with Cathflo  Ultrafiltration Treatment: Yes  Medications Administered During Dialysis: No  Blood Products Administered During Dialysis: No  Labs Sent During Dialysis: No  Heparin Drip Rate Changes: N/A        Last Updated: 6:01 PM by VALERIE EDWARDS

## 2023-11-25 NOTE — DISCHARGE INSTRUCTIONS
Hi Mr. Vines,    You had a complicated hospital course at [place] where you were treated for multiple infections, had to be intubated in the ICU because of aspiration, and developed kidney failure. You were transferred to [facility] to evaluate a mass in your pancreas that is pressing on the bilary system. This mass is most likely cancer based on your images and blood work. We talked with you and your family and feel that hospice is the best way to support your desire to be at home with your family. The hospice team can help prescribe medications to keep you comfortable at home.     Take care,  Your medical team

## 2023-11-25 NOTE — CARE PLAN
Problem: Fall/Injury  Goal: Verbalize understanding of personal risk factors for fall in the hospital  Outcome: Progressing  Goal: Verbalize understanding of risk factor reduction measures to prevent injury from fall in the home  Outcome: Progressing  Goal: Use assistive devices by end of the shift  Outcome: Progressing  Goal: Not fall by end of shift  Outcome: Progressing  Goal: Be free from injury by end of the shift  Outcome: Progressing  Goal: Pace activities to prevent fatigue by end of the shift  Outcome: Progressing     Problem: Skin  Goal: Prevent/minimize sheer/friction injuries  Outcome: Progressing  Goal: Promote/optimize nutrition  Outcome: Progressing  Goal: Promote skin healing  Outcome: Progressing     Problem: Psychosocial Needs  Goal: Demonstrates ability to cope with hospitalization/illness  Outcome: Progressing  Goal: Collaborate with me, my family, and caregiver to identify my specific goals  Outcome: Progressing     Problem: Discharge Barriers  Goal: My discharge needs are met  Outcome: Progressing     Problem: Discharge Planning  Goal: Discharge to home or other facility with appropriate resources  Outcome: Progressing     Problem: Chronic Conditions and Co-morbidities  Goal: Patient's chronic conditions and co-morbidity symptoms are monitored and maintained or improved  Outcome: Progressing     Problem: Nutrition  Goal: Less than 5 days NPO/clear liquids  Outcome: Progressing  Goal: Oral intake greater than 50%  Outcome: Progressing  Goal: Oral intake greater 75%  Outcome: Progressing  Goal: Consume prescribed supplement  Outcome: Progressing  Goal: Adequate PO fluid intake  Outcome: Progressing  Goal: Nutrition support goals are met within 48 hrs  Outcome: Progressing  Goal: Nutrition support is meeting 75% of nutrient needs  Outcome: Progressing  Goal: Tube feed tolerance  Outcome: Progressing  Goal: BG  mg/dL  Outcome: Progressing  Goal: Lab values WNL  Outcome: Progressing  Goal:  Electrolytes WNL  Outcome: Progressing  Goal: Promote healing  Outcome: Progressing  Goal: Maintain stable weight  Outcome: Progressing  Goal: Reduce weight from edema/fluid  Outcome: Progressing  Goal: Gradual weight gain  Outcome: Progressing  Goal: Improve ostomy output  Outcome: Progressing   The patient's goals for the shift include      The clinical goals for the shift include pt will have stable respiratory status

## 2023-11-25 NOTE — PROGRESS NOTES
"Asad Vines is a 78 y.o. male on day 2 of admission presenting with Pancreatic mass.    Subjective   -NAEO  -Pt this AM states he wants to go home and he wants to drink water. I explained our concern for aspiration. Pt insistent on wanting to drink on his own.   -Pt able to state his full name this AM, that he was in the hospital and the year. Was unable to state why he was in the hospital        Objective   Physical Exam:  General: elderly male, alert, conversant, in no apparent distress. Appears jaundiced  HEENT: normocephalic, atraumatic, EOMI. Dry mucous membranes  CV: RRR, no murmurs  Pulm: no increased work of breathing, wearing NC. Observed to have weak cough and aspiration with even small mount of water in a swab to moisten his mouth   Abd: soft, non tender, non distended  : oleary present with dark urine   Ext: warm, no lower extremity edema  Skin: no rashes  Neuro: moving all extremities    Last Recorded Vitals  Blood pressure 150/80, pulse 84, temperature 36.2 °C (97.2 °F), temperature source Skin, resp. rate 16, height 1.676 m (5' 5.98\"), weight 56.7 kg (125 lb), SpO2 96 %.  Intake/Output last 3 Shifts:  I/O last 3 completed shifts:  In: 327 (5.8 mL/kg) [Blood:277; IV Piggyback:50]  Out: 700 (12.3 mL/kg) [Urine:700 (0.3 mL/kg/hr)]  Weight: 56.7 kg     Relevant Results  Lab Results   Component Value Date    WBC 7.2 11/25/2023    HGB 10.1 (L) 11/25/2023    HCT 31.2 (L) 11/25/2023    MCV 89 11/25/2023     11/25/2023      Lab Results   Component Value Date    GLUCOSE 141 (H) 11/25/2023    CALCIUM 8.8 11/25/2023     11/25/2023    K 5.2 11/25/2023    CO2 22 11/25/2023     11/25/2023    BUN 76 (H) 11/25/2023    CREATININE 6.92 (H) 11/25/2023      Lab Results   Component Value Date     (H) 11/25/2023     (H) 11/25/2023    ALKPHOS 394 (H) 11/25/2023    BILITOT 9.0 (H) 11/25/2023        This patient has a urinary catheter   Reason for the urinary catheter remaining today? 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 Now transferred to [facility] on 11/23 for further GI evaluation of pancreatic mass. Overall poor prognosis given prolonged hospital stay with multiple complications, deconditioning, repeated aspiration/weak cough, and underlying malignancy. Upon re-discussion of goals of care, patient transitioned to DNR/DNI without escalation of care to ICU. Pending referral to hospice.     #Pancreatic mass c/f malignancy   #Hyperbilirubinemia 2/2 extrinsic compression   -concerning for malignancy  -CA 19-9: 5,157.47  -Family is no longer interested in pursuing a biopsy given GOC and poor prognosis      #MRSA bacteremia  #MRSA and E faecalis UTI   -Family reports traumatic oleary placement during his admission in October which could be a source for his MRSA  -TTE and HELEN at [place] without evidence of vegetation. Tagged WBC scan without occult source of infection   -Blood cultures clear since 11/9  -Per ID recs, has completed sufficient course of abx for MRSA bacteremia  Prior abx course:   Vancomycin (11/7-11/16) stopped due to kidney injury   Daptomycin (11/19 - 11/21); per OSH MAR last dose was given 11/21  Meropenem (11/19- 11/22)   Ceftriaxone (11/22- 11/25)    #HAP  #Acute hypoxic resp failure  #Aspiration   -Tracheal aspirate with Klebsiella   -completed planned 7 day course of abx to end 11/25  -Per  goals of care discussion 11/25, will allow for comfort feeds. If patient aspirates and develops significant respiratory distress will transition to comfort care at that time      #oliguric QI requiring dialysis via temporary R HD line   -Family reports no hx of CKD. Reportedly developed QI during hospital stay thought due to AIN from vanc vs ATN   -Pt underwent first dialysis 11/21. A second session performed 11/22. Receiving HD today 11/25.   -Maintain oleary, strict I/Os, avoid nephro toxins  -Renal consulted, will follow up recs     #Coagulopathy   -INR reported 4 at OSH. Previously received vitamin K and FFP at OSH   -INR 4.2, PT 47.4, aPTT 172  -Refractory to transfusion of 1 unit of FFP     #COPD  -continue home inhalers      #Hx VTE  -previously on xarelto at home   -Given potential need for biopsy, will hold on therapeutic anticoagulation      F: PRN  E: PRN  N: regular diet, bite sized, thin liquids for comfort   Access: R temp HD line, oleary, PIV   Oxygen: 6L NC   Drips: none  DVT ppx: SubQ heparin  GI ppx: pantoprazole    Code Status: DNR DNI no escalation to ICU   NOK: Anselmo Vines (Son): [phone], Christy (daughter): [phone]  Dispo: pending hospice evaluation     Jocelyn Lloyd MD

## 2023-11-25 NOTE — SIGNIFICANT EVENT
Rapid Response RN Note    Rapid response RN at bedside for RADAR score 6 due to the following VS: T 36 °Celsius; HR 85 ; RR 18; /67; SPO2 90%.     Reviewed above VS with bedside RN.  VS within patient's current trends.  No interventions by rapid response team indicated at this time.      Patient denied pain, shortness of breath, dizziness or lightheadedness.      Staff to page rapid response for any concerns or acute change in condition/VS.

## 2023-11-25 NOTE — NURSING NOTE
Report from Sending RN:    Report From: tariq Gaona RN  Recent Surgery of Procedure: No  Baseline Level of Consciousness (LOC): A/O 2   Oxygen Use: Yes,, 6L LAST PULSE OX 97%  Type: NC  Diabetic: No  Last BP Med Given Day of Dialysis: none   Last Pain Med Given: none  Lab Tests to be Obtained with Dialysis: No,    Blood Transfusion to be Given During Dialysis: No  Available IV Access: Yes, nothing is running through IV  Medications to be Administered During Dialysis: No,    Continuous IV Infusion Running: No  Restraints on Currently or in the Last 24 Hours: No  Hand-Off Communication: full code, no isolation precautions, pt is not on telemetry, travel BY BED

## 2023-11-25 NOTE — CARE PLAN
Problem: Skin  Goal: Prevent/minimize sheer/friction injuries  11/25/2023 1629 by Candelaria Mckeon RN  Flowsheets (Taken 11/23/2023 1335 by Lindy Ma RN)  Prevent/minimize sheer/friction injuries:   Complete micro-shifts as needed if patient unable. Adjust patient position to relieve pressure points, not a full turn   HOB 30 degrees or less   Increase activity/out of bed for meals   Turn/reposition every 2 hours/use positioning/transfer devices   Utilize specialty bed per algorithm   Use pull sheet  11/25/2023 1627 by Candelaria Mckeon RN  Outcome: Not Progressing  Goal: Promote skin healing  11/25/2023 1629 by Candelaria Mckeon RN  Flowsheets (Taken 11/23/2023 1335 by Lindy aM RN)  Promote skin healing:   Assess skin/pad under line(s)/device(s)   Ensure correct size (line/device) and apply per  instructions   Protective dressings over bony prominences   Rotate device position/do not position patient on device   Turn/reposition every 2 hours/use positioning/transfer devices  11/25/2023 1627 by Candelaria Mckeon RN  Outcome: Not Progressing     Problem: Psychosocial Needs  Goal: Demonstrates ability to cope with hospitalization/illness  Outcome: Not Progressing  Goal: Collaborate with me, my family, and caregiver to identify my specific goals  Outcome: Not Progressing     Problem: Chronic Conditions and Co-morbidities  Goal: Patient's chronic conditions and co-morbidity symptoms are monitored and maintained or improved  Outcome: Not Progressing     Problem: Nutrition  Goal: Oral intake greater than 50%  Outcome: Not Progressing  Goal: Consume prescribed supplement  Outcome: Not Progressing  Goal: Adequate PO fluid intake  Outcome: Not Progressing  Goal: Nutrition support goals are met within 48 hrs  Outcome: Not Progressing  Goal: Nutrition support is meeting 75% of nutrient needs  Outcome: Not Progressing  Goal: Tube feed tolerance  Outcome: Not Progressing  Goal: BG   mg/dL  Outcome: Not Progressing  Goal: Lab values WNL  Outcome: Not Progressing  Goal: Electrolytes WNL  Outcome: Not Progressing  Goal: Promote healing  Outcome: Not Progressing  Goal: Maintain stable weight  Outcome: Not Progressing  Goal: Reduce weight from edema/fluid  Outcome: Not Progressing  Goal: Gradual weight gain  Outcome: Not Progressing  Goal: Improve ostomy output  Outcome: Not Progressing   The patient's goals for the shift include      The clinical goals for the shift include pt SpO2 remain greater than 88%    Problem: Skin  Goal: Prevent/minimize sheer/friction injuries  11/25/2023 1629 by Candelaria Mckeon RN  Flowsheets (Taken 11/23/2023 1335 by Lindy Ma RN)  Prevent/minimize sheer/friction injuries:   Complete micro-shifts as needed if patient unable. Adjust patient position to relieve pressure points, not a full turn   HOB 30 degrees or less   Increase activity/out of bed for meals   Turn/reposition every 2 hours/use positioning/transfer devices   Utilize specialty bed per algorithm   Use pull sheet  11/25/2023 1627 by Candelaria Mckeon RN  Outcome: Not Progressing  Goal: Promote/optimize nutrition  11/25/2023 1629 by Candelaria Mckeon RN  Outcome: Progressing  11/25/2023 1627 by Candelaria Mckeon RN  Outcome: Not Progressing  Goal: Promote skin healing  11/25/2023 1629 by Candelaria Mckeon RN  Flowsheets (Taken 11/23/2023 1335 by Lindy Ma RN)  Promote skin healing:   Assess skin/pad under line(s)/device(s)   Ensure correct size (line/device) and apply per  instructions   Protective dressings over bony prominences   Rotate device position/do not position patient on device   Turn/reposition every 2 hours/use positioning/transfer devices  11/25/2023 1627 by Candelaria Mckeon RN  Outcome: Not Progressing     Problem: Psychosocial Needs  Goal: Demonstrates ability to cope with hospitalization/illness  Outcome: Not Progressing  Goal:  Collaborate with me, my family, and caregiver to identify my specific goals  Outcome: Not Progressing     Problem: Chronic Conditions and Co-morbidities  Goal: Patient's chronic conditions and co-morbidity symptoms are monitored and maintained or improved  Outcome: Not Progressing     Problem: Nutrition  Goal: Oral intake greater than 50%  Outcome: Not Progressing  Goal: Consume prescribed supplement  Outcome: Not Progressing  Goal: Adequate PO fluid intake  Outcome: Not Progressing  Goal: Nutrition support goals are met within 48 hrs  Outcome: Not Progressing  Goal: Nutrition support is meeting 75% of nutrient needs  Outcome: Not Progressing  Goal: Tube feed tolerance  Outcome: Not Progressing  Goal: BG  mg/dL  Outcome: Not Progressing  Goal: Lab values WNL  Outcome: Not Progressing  Goal: Electrolytes WNL  Outcome: Not Progressing  Goal: Promote healing  Outcome: Not Progressing  Goal: Maintain stable weight  Outcome: Not Progressing  Goal: Reduce weight from edema/fluid  Outcome: Not Progressing  Goal: Gradual weight gain  Outcome: Not Progressing  Goal: Improve ostomy output  Outcome: Not Progressing

## 2023-11-25 NOTE — ACP (ADVANCE CARE PLANNING)
Advance Care Planning Note     Discussion Date: 11/25/23   Discussion Participants: Patient's son Anselmo    The patient wishes to discuss Advance Care Planning today and the following is a brief summary of our discussion.     Patient has capacity to make their own medical decisions: No  Surrogate Decision Maker documented in chart: Yes    Communication of Medical Status/Prognosis and Treatment Options:   Anselmo (son) understands his father has had a prolonged hospital course including multiple infections, aspiration requiring intubation and ICU care, and has a pancreas mass most likely cancer that is compressing his biliary system. He reported his father's wishes are to come home and be with his loved ones. He and his sister re-discussed amongst themselves and feel that their father would not want to pursue any treatment such as chemotherapy or radiation and understand he is too sick right now to undergo these options. He inquired about surgery to cut out the mass or stenting the bile ducts. I explained that surgery would carry significant risk and stenting has a high rate of failure in the setting of extrinsic compression due to untreated cancer mass effect. Anselmo was expressed understanding and shared that they no longer want to pursue a biopsy of the mass given the risks of doing so. Anselmo was agreeable to hospice referral. We re-discussed Asad's code status in light of the change in the goals of his care and Anselmo was in agreement that changing his code status to DNR/DNI would be in line with his father's wishes at this point. Anselmo agreed that we should allow his father to eat and drink as he pleased and should he have an aspiration event that leads to respiratory distress, we would transition him to comfort care at that point.     Treatment Decisions  -Transition code status to DNR/DNI and no ICU    -SW and hospice referral placed  -Will discontinue NPO and allow for comfort feeding. Should patient aspirate and  develop significant respiratory distress, will transition to comfort care at that time    Jocelyn Lloyd MD  11/25/2023 2:05 PM

## 2023-11-25 NOTE — CARE PLAN
Asad Vines is a 78 y.o. 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 on 11/6/2023 with generalized weakness and shortness of breath. Admitted and treated for septic shock, AHRF requiring intubation due to asp pna with QI requiring hemodialysis. Patient transferred to [facility] on 11/23 for further GI evaluation of pancreatic mass.        #QI-D, nonoliguric secondary to ischemic ATN likely from septic shock with concern for AIN (daptomycin, vancomycin, meropenem stopped 11/22). TDC placed at OSH.   -UA: 2+ proteinuria, >20 RBC, 6-10 WBC  -unknown baseline, last HD 11/22  -non oliguric, ~800cc UOP in last 24h  -volume: mild hypervolemia  -electrolytes: stable      #MRSA bacteremia, on Ceftriaxone     Recommendations:  - Given his uptrend in creatinine despite remaining non oliguric recommend HD today.   - Will continue to monitor for renal recovery   - Avoid nephrotoxins, contrast if possible  - strict Is/Os  - Renal dosing for medications for latest eGFR, follow medication trough as appropriate  - Avoid hypotension/rapid fluctuations in BPs     Noemy Cartwright DO  Nephrology Fellow  24 hour Renal Pager - [phone]

## 2023-11-25 NOTE — CARE PLAN
The patient's goals for the shift include    Problem: Skin  Goal: Prevent/minimize sheer/friction injuries  Outcome: Progressing     Problem: Nutrition  Goal: Less than 5 days NPO/clear liquids  Outcome: Progressing  Goal: BG  mg/dL  Outcome: Progressing       The clinical goals for the shift include remain comfortable, prevent infection, remain safe; participate in oral care, bathing, and weight offloading/turning throughout shift.

## 2023-11-26 VITALS
BODY MASS INDEX: 20.09 KG/M2 | SYSTOLIC BLOOD PRESSURE: 119 MMHG | DIASTOLIC BLOOD PRESSURE: 76 MMHG | TEMPERATURE: 97.3 F | RESPIRATION RATE: 16 BRPM | HEIGHT: 66 IN | WEIGHT: 125 LBS | OXYGEN SATURATION: 91 % | HEART RATE: 74 BPM

## 2023-11-26 PROCEDURE — 94640 AIRWAY INHALATION TREATMENT: CPT

## 2023-11-26 PROCEDURE — 94760 N-INVAS EAR/PLS OXIMETRY 1: CPT

## 2023-11-26 PROCEDURE — 99239 HOSP IP/OBS DSCHRG MGMT >30: CPT | Performed by: STUDENT IN AN ORGANIZED HEALTH CARE EDUCATION/TRAINING PROGRAM

## 2023-11-26 PROCEDURE — 2500000001 HC RX 250 WO HCPCS SELF ADMINISTERED DRUGS (ALT 637 FOR MEDICARE OP): Performed by: STUDENT IN AN ORGANIZED HEALTH CARE EDUCATION/TRAINING PROGRAM

## 2023-11-26 PROCEDURE — 2500000002 HC RX 250 W HCPCS SELF ADMINISTERED DRUGS (ALT 637 FOR MEDICARE OP, ALT 636 FOR OP/ED): Performed by: STUDENT IN AN ORGANIZED HEALTH CARE EDUCATION/TRAINING PROGRAM

## 2023-11-26 RX ORDER — OXYCODONE HYDROCHLORIDE 5 MG/1
5 TABLET ORAL EVERY 6 HOURS PRN
Status: DISCONTINUED | OUTPATIENT
Start: 2023-11-26 | End: 2023-11-26 | Stop reason: HOSPADM

## 2023-11-26 RX ORDER — OXYCODONE HYDROCHLORIDE 5 MG/1
5 TABLET ORAL EVERY 6 HOURS PRN
Qty: 15 TABLET | Refills: 0
Start: 2023-11-26

## 2023-11-26 RX ORDER — ACETAMINOPHEN 325 MG/1
650 TABLET ORAL EVERY 6 HOURS PRN
Qty: 30 TABLET | Refills: 0
Start: 2023-11-26

## 2023-11-26 RX ORDER — ACETAMINOPHEN 325 MG/1
650 TABLET ORAL EVERY 6 HOURS PRN
Status: DISCONTINUED | OUTPATIENT
Start: 2023-11-26 | End: 2023-11-26 | Stop reason: HOSPADM

## 2023-11-26 RX ADMIN — OXYCODONE HYDROCHLORIDE 5 MG: 5 TABLET ORAL at 10:37

## 2023-11-26 RX ADMIN — IPRATROPIUM BROMIDE AND ALBUTEROL SULFATE 3 ML: .5; 3 SOLUTION RESPIRATORY (INHALATION) at 09:36

## 2023-11-26 ASSESSMENT — PAIN - FUNCTIONAL ASSESSMENT: PAIN_FUNCTIONAL_ASSESSMENT: PAINAD (PAIN ASSESSMENT IN ADVANCED DEMENTIA SCALE)

## 2023-11-26 NOTE — CARE PLAN
Problem: Fall/Injury  Goal: Verbalize understanding of personal risk factors for fall in the hospital  Outcome: Progressing  Goal: Verbalize understanding of risk factor reduction measures to prevent injury from fall in the home  Outcome: Progressing  Goal: Use assistive devices by end of the shift  Outcome: Progressing  Goal: Pace activities to prevent fatigue by end of the shift  Outcome: Progressing   The patient's goals for the shift include      The clinical goals for the shift include pt will remain O2 Sats at 90%

## 2023-11-26 NOTE — CARE PLAN
Problem: Fall/Injury  Goal: Verbalize understanding of personal risk factors for fall in the hospital  Outcome: Progressing  Goal: Verbalize understanding of risk factor reduction measures to prevent injury from fall in the home  Outcome: Progressing  Goal: Use assistive devices by end of the shift  Outcome: Progressing  Goal: Pace activities to prevent fatigue by end of the shift  Outcome: Progressing     Problem: Skin  Goal: Prevent/minimize sheer/friction injuries  Outcome: Progressing  Goal: Promote/optimize nutrition  Outcome: Progressing  Goal: Promote skin healing  Outcome: Progressing     Problem: Nutrition  Goal: Lab values WNL  Outcome: Progressing  Goal: Electrolytes WNL  Outcome: Progressing  Goal: Promote healing  Outcome: Progressing  Goal: Reduce weight from edema/fluid  Outcome: Progressing   The patient's goals for the shift include      The clinical goals for the shift include pt will remain O2 Sats at 90%    Over the shift, the patient did not make progress toward the following goals. Barriers to progression include disease process. Recommendations to address these barriers include follow med recommendations.

## 2023-11-26 NOTE — NURSING NOTE
Patient being discharged to home with hospice via [facility]. Patient left in stable condition. Discharge instructions reviewed with patients daughter. IV's removed. Patient belongings collected by family member prior to discharge. Lee catheter left in place per order.

## 2023-11-26 NOTE — CARE PLAN
Problem: Fall/Injury  Goal: Verbalize understanding of personal risk factors for fall in the hospital  Outcome: Progressing  Goal: Verbalize understanding of risk factor reduction measures to prevent injury from fall in the home  Outcome: Progressing  Goal: Use assistive devices by end of the shift  Outcome: Progressing  Goal: Pace activities to prevent fatigue by end of the shift  Outcome: Progressing     Problem: Nutrition  Goal: Lab values WNL  Outcome: Progressing  Goal: Electrolytes WNL  Outcome: Progressing  Goal: Promote healing  Outcome: Progressing  Goal: Reduce weight from edema/fluid  Outcome: Progressing   The patient's goals for the shift include

## 2023-11-26 NOTE — PROGRESS NOTES
Pt is ready for discharge today at 10:00 am to home with hospice.  Called [facility] to inform them of discharge time. They will meet pt at his home. Erendira GUADARRAMA, RUFINA.

## 2023-11-26 NOTE — NURSING NOTE
Dr. Kathleen Lloyd at bedside, removed HD, sutures kit and pressure dressing provided by this nurse

## 2023-11-26 NOTE — DISCHARGE SUMMARY
Discharge Diagnosis  Pancreatic mass  MRSA bacteremia  MRSA UTI  Aspiration pneumonia   QI requiring dialysis      Issues Requiring Follow-Up  [ ] continued hospice care at home    Test Results Pending At Discharge  Pending Labs       Order Current Status    Ammonia Collected (11/23/23 1716)    Mixing Study, APTT In process    Mixing Study, PT In process            Hospital Course  Asad Vines is a 78 y.o. 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 Now transferred to [facility] on 11/23 for further GI evaluation of pancreatic mass.     Patient had long and prolonged course at the outside hospital including sepsis with MRSA bacteremia and MRSA UTI of unknown source.  Infectious disease was consulted [facility].  HELEN on 11/10/2023 showed no significant evidence of vegetations on the valves, no endocarditis, mild MR, no evidence of thrombus in the atrial cavity or appendage, no defect or PFO identified.  TTE on 11/8/2023 showed concentric left VH, likely normal systolic function, regional wall motion abnormalities cannot be excluded.  Patient received tagged WBC study which did not find a source of infection.  On 11/14 patient was noted to be jaundiced with hyperbilirubinemia and elevated LFTs.  GI was consulted with plan for ERCP however this was later deferred due to coagulopathy with INR elevated to 4.0 even despite FFP and vitamin K.  During his hospital stay, patient developed QI and eventual renal failure thought due to AIN due to vancomycin vs ATN due to shock. Nephrology eventually start the patient on dialysis with first session 11/21.  On  , rapid response was called for hypoxia thought due to aspiration.  Patient eventually required transfer to the MICU on high flow nasal cannula and was later intubated after altered mental status.  Prior to intubation, patient was noted to be hypotensive with SBP in 90s, for which norepinephrine was started on.  Patient was extubated eventually to 5L NC. Transferred out of ICU on .    Upon admission to [facility], patient was continued on abx for prior infections. Nephrology was consulted for recommendations regarding hemodialysis in the setting of QI with renal failure. Pt was witnessed to have continued aspiration of oral secretions and very weak cough. Labs notable for uptrending bilirubin due to mass effect on the biliary system. CA 19-9 highly elevated >5000 which supported diagnosis of presumed pancreas cancer. GOC were re-discussed with patient and family and ultimately family elected to focus on patient's priority of being at home and eating for comfort despite aspiration risk. Family no longer wanted to pursue biopsy of mass and elected to pursue hospice care at home. Patient was discharged on  with home hospice arranged.     Pertinent Physical Exam At Time of Discharge  Physical Exam:  General: elderly male, alert, conversant, in no apparent distress. At times confused speaking about his  parents but other times asking appropriate questions   HEENT: normocephalic, atraumatic, EOMI, no scleral icterus  CV: RRR, no murmurs  Pulm: weak cough, observed coughing with food intake. Wearing NC, no increased work of breathing   Abd: soft, non tender, non distended  : oleary present with minimal dark urine    Ext: warm, no lower extremity edema  Skin: no rashes. R temproary HD line removed with dressing overlying c/d/I   Neuro: moving all extremities  Psych: normal affect      Home Medications     Medication List      START taking these medications     acetaminophen 325 mg tablet; Commonly known  as: Tylenol; Take 2 tablets   (650 mg) by mouth every 6 hours if needed for mild pain (1 - 3).   ipratropium-albuteroL 0.5-2.5 mg/3 mL nebulizer solution; Commonly known   as: Duo-Neb; Take 3 mL by nebulization 3 times a day.   oxyCODONE 5 mg immediate release tablet; Commonly known as: Roxicodone;   Take 1 tablet (5 mg) by mouth every 6 hours if needed for severe pain (7 -   10).   oxygen gas therapy; Commonly known as: O2; Inhale 1 each once every 24   hours.   polyethylene glycol 17 gram packet; Commonly known as: Glycolax,   Miralax; Take 17 g by mouth once daily. Do not start before November 26, 2023.   sertraline 100 mg tablet; Commonly known as: Zoloft; Take 1 tablet (100   mg) by mouth once daily at bedtime.     Outpatient Follow-Up  No future appointments.    Jocelyn Lloyd MD

## 2023-11-27 ENCOUNTER — HOSPITAL ENCOUNTER (OUTPATIENT)
Dept: CARDIOLOGY | Facility: HOSPITAL | Age: 78
Discharge: HOME | End: 2023-11-27
Payer: MEDICARE

## 2023-11-27 LAB
APTT 1H NP PPP: 180 SECONDS (ref 26–39)
APTT PPP: >200 SECONDS (ref 27–38)
ATRIAL RATE: 88 BPM
INR PPP: 4.4 (ref 0.9–1.1)
P AXIS: 195 DEGREES
PATH REVIEW- MIXING STUDIES: NORMAL
PR INTERVAL: 104 MS
PROTHROMBIN TIME: 50.9 SECONDS (ref 9.8–12.8)
PT IMM NP PPP: 40.5 SECONDS (ref 9.8–13.4)
Q ONSET: 229 MS
QRS COUNT: 15 BEATS
QRS DURATION: 92 MS
QT INTERVAL: 350 MS
QTC CALCULATION(BAZETT): 423 MS
QTC FREDERICIA: 397 MS
R AXIS: 174 DEGREES
T AXIS: 133 DEGREES
T OFFSET: 404 MS
THROMBIN TIME: 15.8 SECONDS (ref 10.3–16.6)
VENTRICULAR RATE: 88 BPM

## 2023-11-27 PROCEDURE — 93005 ELECTROCARDIOGRAM TRACING: CPT
